# Patient Record
Sex: FEMALE | Race: BLACK OR AFRICAN AMERICAN | NOT HISPANIC OR LATINO | Employment: FULL TIME | URBAN - METROPOLITAN AREA
[De-identification: names, ages, dates, MRNs, and addresses within clinical notes are randomized per-mention and may not be internally consistent; named-entity substitution may affect disease eponyms.]

---

## 2017-02-23 ENCOUNTER — TELEPHONE (OUTPATIENT)
Dept: NURSING | Facility: CLINIC | Age: 22
End: 2017-02-23

## 2017-02-24 NOTE — TELEPHONE ENCOUNTER
"Call Type: Triage Call    Presenting Problem: \"There is a lump under my armpit that has deep  hole draining musuc, pus-like drainage.\"  Triage Note:  Guideline Title: Wound Infection Suspected (Pediatric) ; Boil (Skin  Abscess) (Pediatric)  Recommended Disposition: See ED Immediately  Original Inclination:  Override Disposition:  Intended Action:  Physician Contacted: No  Black (necrotic) tissue or blisters develop in wound ?  YES  [1] Wound infection AND [2] taking an antibiotic ? NO  Severe pain in the wound ? NO  [1] Widespread rash AND [2] bright red, sunburn-like ? NO  Sounds like a life-threatening emergency to the triager ? NO  [1] Widespread bright red rash AND [2] fainted or too weak to stand ? NO  Stitches and not infected ? NO  [1] MRSA questions or exposure AND [2] no symptoms ? NO  Major surgical wound ? NO  [1] Animal or human bite that is infected AND [2] taking an antibiotic ? NO  [1] Boil (skin abscess) AND [2] taking an antibiotic and/or incised and drained ?  NO  [1] Cellulitis diagnosed AND [2] taking an antibiotic ? NO  Boil suspected (red lump in skin) ? NO  Sounds like a life-threatening emergency to the triager ? NO  [1] Widespread red rash AND [2] fever AND [3] fainted or too weak to stand ? NO  [1] Boil (skin abscess) AND [2] taking an antibiotic and/or incised and drained ?  NO  Physician Instructions:  Care Advice: GO TO ED NOW: * Your child needs to be seen within the next  hour. Go to the ER/UCC at _____________ Hospital. Leave as soon as you can.  PAIN MEDICINE: * For pain relief, give acetaminophen every 4 hours OR  ibuprofen every 6 hours as needed. (See Dosage table.)  "

## 2017-07-19 ENCOUNTER — COMMUNICATION - HEALTHEAST (OUTPATIENT)
Dept: FAMILY MEDICINE | Facility: CLINIC | Age: 22
End: 2017-07-19

## 2017-07-19 ENCOUNTER — OFFICE VISIT - HEALTHEAST (OUTPATIENT)
Dept: FAMILY MEDICINE | Facility: CLINIC | Age: 22
End: 2017-07-19

## 2017-07-19 DIAGNOSIS — A64 STD (FEMALE): ICD-10-CM

## 2017-07-19 DIAGNOSIS — N76.0 BACTERIAL VAGINOSIS: ICD-10-CM

## 2017-07-19 DIAGNOSIS — N89.8 VAGINAL IRRITATION: ICD-10-CM

## 2017-07-19 DIAGNOSIS — Z00.00 ANNUAL PHYSICAL EXAM: ICD-10-CM

## 2017-07-19 DIAGNOSIS — B96.89 BACTERIAL VAGINOSIS: ICD-10-CM

## 2017-07-19 ASSESSMENT — MIFFLIN-ST. JEOR: SCORE: 1445.55

## 2017-07-20 ENCOUNTER — COMMUNICATION - HEALTHEAST (OUTPATIENT)
Dept: FAMILY MEDICINE | Facility: CLINIC | Age: 22
End: 2017-07-20

## 2017-07-27 LAB
BKR LAB AP ABNORMAL BLEEDING: NO
BKR LAB AP BIRTH CONTROL/HORMONES: NORMAL
BKR LAB AP CERVICAL APPEARANCE: NORMAL
BKR LAB AP GYN ADEQUACY: NORMAL
BKR LAB AP GYN INTERPRETATION: NORMAL
BKR LAB AP GYN OTHER FINDINGS: NORMAL
BKR LAB AP HPV REFLEX: NORMAL
BKR LAB AP LMP: NORMAL
BKR LAB AP PATIENT STATUS: NORMAL
BKR LAB AP PREVIOUS ABNORMAL: NORMAL
BKR LAB AP PREVIOUS NORMAL: NORMAL
HIGH RISK?: NO
PATH REPORT.COMMENTS IMP SPEC: NORMAL
RESULT FLAG (HE HISTORICAL CONVERSION): NORMAL

## 2017-11-06 ENCOUNTER — COMMUNICATION - HEALTHEAST (OUTPATIENT)
Dept: FAMILY MEDICINE | Facility: CLINIC | Age: 22
End: 2017-11-06

## 2018-01-15 ENCOUNTER — COMMUNICATION - HEALTHEAST (OUTPATIENT)
Dept: FAMILY MEDICINE | Facility: CLINIC | Age: 23
End: 2018-01-15

## 2018-06-28 ENCOUNTER — COMMUNICATION - HEALTHEAST (OUTPATIENT)
Dept: FAMILY MEDICINE | Facility: CLINIC | Age: 23
End: 2018-06-28

## 2018-07-18 ENCOUNTER — OFFICE VISIT - HEALTHEAST (OUTPATIENT)
Dept: FAMILY MEDICINE | Facility: CLINIC | Age: 23
End: 2018-07-18

## 2018-07-18 ENCOUNTER — COMMUNICATION - HEALTHEAST (OUTPATIENT)
Dept: FAMILY MEDICINE | Facility: CLINIC | Age: 23
End: 2018-07-18

## 2018-07-18 ENCOUNTER — OFFICE VISIT - HEALTHEAST (OUTPATIENT)
Dept: MIDWIFE SERVICES | Facility: CLINIC | Age: 23
End: 2018-07-18

## 2018-07-18 DIAGNOSIS — B37.31 YEAST INFECTION OF THE VAGINA: ICD-10-CM

## 2018-07-18 DIAGNOSIS — N89.8 VAGINAL DISCHARGE: ICD-10-CM

## 2018-07-18 DIAGNOSIS — Z78.9 DATE OF LAST MENSTRUAL PERIOD (LMP) UNKNOWN: ICD-10-CM

## 2018-07-18 DIAGNOSIS — Z30.431 IUD CHECK UP: ICD-10-CM

## 2018-07-18 DIAGNOSIS — T83.32XA MALPOSITIONED INTRAUTERINE DEVICE (IUD), INITIAL ENCOUNTER: ICD-10-CM

## 2018-07-18 LAB
CLUE CELLS: ABNORMAL
HCG UR QL: NEGATIVE
SP GR UR STRIP: 1.01 (ref 1–1.03)
TRICHOMONAS, WET PREP: ABNORMAL
YEAST, WET PREP: ABNORMAL

## 2018-07-18 ASSESSMENT — MIFFLIN-ST. JEOR: SCORE: 1486.37

## 2018-07-19 LAB
C TRACH DNA SPEC QL PROBE+SIG AMP: NEGATIVE
N GONORRHOEA DNA SPEC QL NAA+PROBE: NEGATIVE

## 2018-07-20 ENCOUNTER — HOSPITAL ENCOUNTER (OUTPATIENT)
Dept: ULTRASOUND IMAGING | Facility: CLINIC | Age: 23
Discharge: HOME | End: 2018-07-20
Attending: ADVANCED PRACTICE MIDWIFE

## 2018-07-20 ENCOUNTER — COMMUNICATION - HEALTHEAST (OUTPATIENT)
Dept: FAMILY MEDICINE | Facility: CLINIC | Age: 23
End: 2018-07-20

## 2018-07-20 DIAGNOSIS — T83.32XA MALPOSITIONED INTRAUTERINE DEVICE (IUD), INITIAL ENCOUNTER: ICD-10-CM

## 2018-07-25 ENCOUNTER — COMMUNICATION - HEALTHEAST (OUTPATIENT)
Dept: FAMILY MEDICINE | Facility: CLINIC | Age: 23
End: 2018-07-25

## 2018-12-19 ENCOUNTER — COMMUNICATION - HEALTHEAST (OUTPATIENT)
Dept: MIDWIFE SERVICES | Facility: CLINIC | Age: 23
End: 2018-12-19

## 2018-12-24 ENCOUNTER — OFFICE VISIT - HEALTHEAST (OUTPATIENT)
Dept: MIDWIFE SERVICES | Facility: CLINIC | Age: 23
End: 2018-12-24

## 2018-12-24 DIAGNOSIS — Z30.430 ENCOUNTER FOR IUD INSERTION: ICD-10-CM

## 2018-12-24 DIAGNOSIS — Z01.812 PRE-PROCEDURE LAB EXAM: ICD-10-CM

## 2018-12-24 LAB
HCG UR QL: NEGATIVE
SP GR UR STRIP: 1.03 (ref 1–1.03)

## 2018-12-24 ASSESSMENT — MIFFLIN-ST. JEOR: SCORE: 1465.96

## 2018-12-26 LAB
C TRACH DNA SPEC QL PROBE+SIG AMP: NEGATIVE
N GONORRHOEA DNA SPEC QL NAA+PROBE: NEGATIVE

## 2019-02-01 ENCOUNTER — OFFICE VISIT - HEALTHEAST (OUTPATIENT)
Dept: FAMILY MEDICINE | Facility: CLINIC | Age: 24
End: 2019-02-01

## 2019-02-01 DIAGNOSIS — A08.4 VIRAL GASTROENTERITIS: ICD-10-CM

## 2019-02-01 DIAGNOSIS — R11.2 NAUSEA AND VOMITING, INTRACTABILITY OF VOMITING NOT SPECIFIED, UNSPECIFIED VOMITING TYPE: ICD-10-CM

## 2019-02-01 LAB
ALBUMIN SERPL-MCNC: 4 G/DL (ref 3.5–5)
ALP SERPL-CCNC: 56 U/L (ref 45–120)
ALT SERPL W P-5'-P-CCNC: 14 U/L (ref 0–45)
ANION GAP SERPL CALCULATED.3IONS-SCNC: 11 MMOL/L (ref 5–18)
AST SERPL W P-5'-P-CCNC: 16 U/L (ref 0–40)
BASOPHILS # BLD AUTO: 0 THOU/UL (ref 0–0.2)
BASOPHILS NFR BLD AUTO: 0 % (ref 0–2)
BILIRUB SERPL-MCNC: 0.3 MG/DL (ref 0–1)
BUN SERPL-MCNC: 5 MG/DL (ref 8–22)
CALCIUM SERPL-MCNC: 9.8 MG/DL (ref 8.5–10.5)
CHLORIDE BLD-SCNC: 107 MMOL/L (ref 98–107)
CO2 SERPL-SCNC: 25 MMOL/L (ref 22–31)
CREAT SERPL-MCNC: 0.87 MG/DL (ref 0.6–1.1)
DEPRECATED S PYO AG THROAT QL EIA: NORMAL
EOSINOPHIL # BLD AUTO: 0.1 THOU/UL (ref 0–0.4)
EOSINOPHIL NFR BLD AUTO: 2 % (ref 0–6)
ERYTHROCYTE [DISTWIDTH] IN BLOOD BY AUTOMATED COUNT: 13.3 % (ref 11–14.5)
GFR SERPL CREATININE-BSD FRML MDRD: >60 ML/MIN/1.73M2
GLUCOSE BLD-MCNC: 91 MG/DL (ref 70–125)
HCT VFR BLD AUTO: 40.6 % (ref 35–47)
HGB BLD-MCNC: 13.3 G/DL (ref 12–16)
LYMPHOCYTES # BLD AUTO: 2 THOU/UL (ref 0.8–4.4)
LYMPHOCYTES NFR BLD AUTO: 37 % (ref 20–40)
MCH RBC QN AUTO: 28.7 PG (ref 27–34)
MCHC RBC AUTO-ENTMCNC: 32.7 G/DL (ref 32–36)
MCV RBC AUTO: 88 FL (ref 80–100)
MONOCYTES # BLD AUTO: 0.4 THOU/UL (ref 0–0.9)
MONOCYTES NFR BLD AUTO: 7 % (ref 2–10)
NEUTROPHILS # BLD AUTO: 3 THOU/UL (ref 2–7.7)
NEUTROPHILS NFR BLD AUTO: 54 % (ref 50–70)
PLATELET # BLD AUTO: 280 THOU/UL (ref 140–440)
PMV BLD AUTO: 8.3 FL (ref 7–10)
POTASSIUM BLD-SCNC: 4.4 MMOL/L (ref 3.5–5)
PROT SERPL-MCNC: 7.6 G/DL (ref 6–8)
RBC # BLD AUTO: 4.62 MILL/UL (ref 3.8–5.4)
SODIUM SERPL-SCNC: 143 MMOL/L (ref 136–145)
WBC: 5.5 THOU/UL (ref 4–11)

## 2019-02-02 LAB — GROUP A STREP BY PCR: NORMAL

## 2019-02-21 ENCOUNTER — COMMUNICATION - HEALTHEAST (OUTPATIENT)
Dept: FAMILY MEDICINE | Facility: CLINIC | Age: 24
End: 2019-02-21

## 2019-02-28 ENCOUNTER — OFFICE VISIT - HEALTHEAST (OUTPATIENT)
Dept: FAMILY MEDICINE | Facility: CLINIC | Age: 24
End: 2019-02-28

## 2019-02-28 DIAGNOSIS — F43.23 ADJUSTMENT DISORDER WITH MIXED ANXIETY AND DEPRESSED MOOD: ICD-10-CM

## 2019-03-06 ENCOUNTER — COMMUNICATION - HEALTHEAST (OUTPATIENT)
Dept: FAMILY MEDICINE | Facility: CLINIC | Age: 24
End: 2019-03-06

## 2019-03-27 ENCOUNTER — COMMUNICATION - HEALTHEAST (OUTPATIENT)
Dept: FAMILY MEDICINE | Facility: CLINIC | Age: 24
End: 2019-03-27

## 2019-04-04 ENCOUNTER — OFFICE VISIT - HEALTHEAST (OUTPATIENT)
Dept: FAMILY MEDICINE | Facility: CLINIC | Age: 24
End: 2019-04-04

## 2019-04-04 DIAGNOSIS — F43.23 ADJUSTMENT DISORDER WITH MIXED ANXIETY AND DEPRESSED MOOD: ICD-10-CM

## 2019-05-09 ENCOUNTER — OFFICE VISIT - HEALTHEAST (OUTPATIENT)
Dept: MIDWIFE SERVICES | Facility: CLINIC | Age: 24
End: 2019-05-09

## 2019-05-09 ENCOUNTER — AMBULATORY - HEALTHEAST (OUTPATIENT)
Dept: MIDWIFE SERVICES | Facility: CLINIC | Age: 24
End: 2019-05-09

## 2019-05-09 DIAGNOSIS — Z30.431 IUD CHECK UP: ICD-10-CM

## 2019-05-09 DIAGNOSIS — B37.31 YEAST VAGINITIS: ICD-10-CM

## 2019-05-09 DIAGNOSIS — Z11.3 SCREEN FOR STD (SEXUALLY TRANSMITTED DISEASE): ICD-10-CM

## 2019-05-09 LAB
CLUE CELLS: ABNORMAL
HIV 1+2 AB+HIV1 P24 AG SERPL QL IA: NEGATIVE
TRICHOMONAS, WET PREP: ABNORMAL
YEAST, WET PREP: ABNORMAL

## 2019-05-09 ASSESSMENT — MIFFLIN-ST. JEOR: SCORE: 1406.99

## 2019-05-10 LAB
C TRACH DNA SPEC QL PROBE+SIG AMP: NEGATIVE
N GONORRHOEA DNA SPEC QL NAA+PROBE: NEGATIVE
T PALLIDUM AB SER QL: NEGATIVE

## 2019-07-29 ENCOUNTER — COMMUNICATION - HEALTHEAST (OUTPATIENT)
Dept: FAMILY MEDICINE | Facility: CLINIC | Age: 24
End: 2019-07-29

## 2019-07-29 DIAGNOSIS — F43.23 ADJUSTMENT DISORDER WITH MIXED ANXIETY AND DEPRESSED MOOD: ICD-10-CM

## 2019-08-07 ENCOUNTER — OFFICE VISIT - HEALTHEAST (OUTPATIENT)
Dept: FAMILY MEDICINE | Facility: CLINIC | Age: 24
End: 2019-08-07

## 2019-08-07 DIAGNOSIS — F43.23 ADJUSTMENT DISORDER WITH MIXED ANXIETY AND DEPRESSED MOOD: ICD-10-CM

## 2019-08-14 ENCOUNTER — COMMUNICATION - HEALTHEAST (OUTPATIENT)
Dept: FAMILY MEDICINE | Facility: CLINIC | Age: 24
End: 2019-08-14

## 2019-08-14 DIAGNOSIS — F43.23 ADJUSTMENT DISORDER WITH MIXED ANXIETY AND DEPRESSED MOOD: ICD-10-CM

## 2019-08-20 ENCOUNTER — COMMUNICATION - HEALTHEAST (OUTPATIENT)
Dept: FAMILY MEDICINE | Facility: CLINIC | Age: 24
End: 2019-08-20

## 2019-10-05 ENCOUNTER — COMMUNICATION - HEALTHEAST (OUTPATIENT)
Dept: FAMILY MEDICINE | Facility: CLINIC | Age: 24
End: 2019-10-05

## 2019-10-05 DIAGNOSIS — F43.23 ADJUSTMENT DISORDER WITH MIXED ANXIETY AND DEPRESSED MOOD: ICD-10-CM

## 2019-10-08 ENCOUNTER — COMMUNICATION - HEALTHEAST (OUTPATIENT)
Dept: SCHEDULING | Facility: CLINIC | Age: 24
End: 2019-10-08

## 2019-10-22 ENCOUNTER — COMMUNICATION - HEALTHEAST (OUTPATIENT)
Dept: FAMILY MEDICINE | Facility: CLINIC | Age: 24
End: 2019-10-22

## 2019-11-26 ENCOUNTER — OFFICE VISIT - HEALTHEAST (OUTPATIENT)
Dept: FAMILY MEDICINE | Facility: CLINIC | Age: 24
End: 2019-11-26

## 2019-11-26 DIAGNOSIS — F41.1 GAD (GENERALIZED ANXIETY DISORDER): ICD-10-CM

## 2019-11-26 DIAGNOSIS — F43.23 ADJUSTMENT DISORDER WITH MIXED ANXIETY AND DEPRESSED MOOD: ICD-10-CM

## 2019-11-26 ASSESSMENT — ANXIETY QUESTIONNAIRES
5. BEING SO RESTLESS THAT IT IS HARD TO SIT STILL: NEARLY EVERY DAY
4. TROUBLE RELAXING: MORE THAN HALF THE DAYS
3. WORRYING TOO MUCH ABOUT DIFFERENT THINGS: MORE THAN HALF THE DAYS
IF YOU CHECKED OFF ANY PROBLEMS ON THIS QUESTIONNAIRE, HOW DIFFICULT HAVE THESE PROBLEMS MADE IT FOR YOU TO DO YOUR WORK, TAKE CARE OF THINGS AT HOME, OR GET ALONG WITH OTHER PEOPLE: VERY DIFFICULT
2. NOT BEING ABLE TO STOP OR CONTROL WORRYING: MORE THAN HALF THE DAYS
1. FEELING NERVOUS, ANXIOUS, OR ON EDGE: MORE THAN HALF THE DAYS
6. BECOMING EASILY ANNOYED OR IRRITABLE: SEVERAL DAYS
GAD7 TOTAL SCORE: 13
7. FEELING AFRAID AS IF SOMETHING AWFUL MIGHT HAPPEN: SEVERAL DAYS

## 2019-11-26 ASSESSMENT — PATIENT HEALTH QUESTIONNAIRE - PHQ9: SUM OF ALL RESPONSES TO PHQ QUESTIONS 1-9: 18

## 2019-12-11 ENCOUNTER — COMMUNICATION - HEALTHEAST (OUTPATIENT)
Dept: FAMILY MEDICINE | Facility: CLINIC | Age: 24
End: 2019-12-11

## 2019-12-19 ENCOUNTER — OFFICE VISIT - HEALTHEAST (OUTPATIENT)
Dept: FAMILY MEDICINE | Facility: CLINIC | Age: 24
End: 2019-12-19

## 2019-12-19 DIAGNOSIS — F41.1 GAD (GENERALIZED ANXIETY DISORDER): ICD-10-CM

## 2019-12-19 DIAGNOSIS — F43.23 ADJUSTMENT DISORDER WITH MIXED ANXIETY AND DEPRESSED MOOD: ICD-10-CM

## 2019-12-19 ASSESSMENT — ANXIETY QUESTIONNAIRES
2. NOT BEING ABLE TO STOP OR CONTROL WORRYING: MORE THAN HALF THE DAYS
3. WORRYING TOO MUCH ABOUT DIFFERENT THINGS: MORE THAN HALF THE DAYS
7. FEELING AFRAID AS IF SOMETHING AWFUL MIGHT HAPPEN: SEVERAL DAYS
4. TROUBLE RELAXING: NEARLY EVERY DAY
GAD7 TOTAL SCORE: 14
IF YOU CHECKED OFF ANY PROBLEMS ON THIS QUESTIONNAIRE, HOW DIFFICULT HAVE THESE PROBLEMS MADE IT FOR YOU TO DO YOUR WORK, TAKE CARE OF THINGS AT HOME, OR GET ALONG WITH OTHER PEOPLE: VERY DIFFICULT
5. BEING SO RESTLESS THAT IT IS HARD TO SIT STILL: NEARLY EVERY DAY
6. BECOMING EASILY ANNOYED OR IRRITABLE: SEVERAL DAYS
1. FEELING NERVOUS, ANXIOUS, OR ON EDGE: MORE THAN HALF THE DAYS

## 2019-12-19 ASSESSMENT — PATIENT HEALTH QUESTIONNAIRE - PHQ9: SUM OF ALL RESPONSES TO PHQ QUESTIONS 1-9: 11

## 2019-12-27 ENCOUNTER — COMMUNICATION - HEALTHEAST (OUTPATIENT)
Dept: FAMILY MEDICINE | Facility: CLINIC | Age: 24
End: 2019-12-27

## 2020-05-07 ENCOUNTER — COMMUNICATION - HEALTHEAST (OUTPATIENT)
Dept: FAMILY MEDICINE | Facility: CLINIC | Age: 25
End: 2020-05-07

## 2020-05-07 DIAGNOSIS — F43.23 ADJUSTMENT DISORDER WITH MIXED ANXIETY AND DEPRESSED MOOD: ICD-10-CM

## 2020-05-07 DIAGNOSIS — F41.1 GAD (GENERALIZED ANXIETY DISORDER): ICD-10-CM

## 2020-05-20 ENCOUNTER — COMMUNICATION - HEALTHEAST (OUTPATIENT)
Dept: FAMILY MEDICINE | Facility: CLINIC | Age: 25
End: 2020-05-20

## 2020-05-20 DIAGNOSIS — F43.23 ADJUSTMENT DISORDER WITH MIXED ANXIETY AND DEPRESSED MOOD: ICD-10-CM

## 2020-07-13 ENCOUNTER — VIRTUAL VISIT (OUTPATIENT)
Dept: FAMILY MEDICINE | Facility: OTHER | Age: 25
End: 2020-07-13

## 2020-07-13 NOTE — PROGRESS NOTES
"Date: 2020 11:10:15  Clinician: Artur Melchor  Clinician NPI: [id number]  Patient: Timoteo Alamo  Patient : 1995  [place] [address] [place] [id number]  Patient Phone: [phone]  Visit Protocol: URI  Patient Summary:  Timoteo is a 25 year old ( : 1995 ) female who initiated a Visit for COVID-19 (Coronavirus) evaluation and screening. When asked the question \"Please sign me up to receive news, health information and promotions from [employer].\", Timoteo responded \"No\".    Timoteo states her symptoms started suddenly 3-4 days ago.   Her symptoms consist of ageusia, rhinitis, malaise, a headache, chills, myalgia, anosmia, facial pain or pressure, a cough, and nasal congestion. She is experiencing difficulty breathing due to nasal congestion but she is not short of breath.   Symptom details     Nasal secretions: The color of her mucus is white and clear.    Cough: Timoteo coughs a few times an hour and her cough is not more bothersome at night. Phlegm comes into her throat when she coughs. She believes her cough is caused by post-nasal drip. The color of the phlegm is yellow.     Facial pain or pressure: The facial pain or pressure feels worse when bending over or leaning forward.     Headache: She states the headache is moderate (4-6 on a 10 point pain scale).      Timoteo denies having wheezing, nausea, teeth pain, diarrhea, vomiting, ear pain, sore throat, and fever. She also denies having recent facial or sinus surgery in the past 60 days, taking antibiotic medication in the past month, and double sickening (worsening symptoms after initial improvement).   Precipitating events  She has not recently been exposed to someone with influenza. Timoteo has not been in close contact with any high risk individuals.   Pertinent COVID-19 (Coronavirus) information  In the past 14 days, Timoteo has not worked in a congregate living setting.   She does not work or volunteer as healthcare " worker or a  and does not work or volunteer in a healthcare facility.   Timoteo also has not lived in a congregate living setting in the past 14 days. She does not live with a healthcare worker.   Timoteo has not had a close contact with a laboratory-confirmed COVID-19 patient within 14 days of symptom onset.   Pertinent medical history  Timoteo typically gets a yeast infection when she takes antibiotics. She is not sure if she has used fluconazole (Diflucan) to treat previous yeast infections.   Timoteo needs a return to work/school note.   Weight: 148 lbs   Timoteo does not smoke or use smokeless tobacco.   She denies pregnancy and denies breastfeeding. She has menstruated in the past month.   Weight: 148 lbs    MEDICATIONS: buspirone oral, duloxetine oral, ALLERGIES: NKDA  Clinician Response:  Dear Timoteo,   Your symptoms show that you may have coronavirus (COVID-19). This illness can cause fever, cough and trouble breathing. Many people get a mild case and get better on their own. Some people can get very sick.  What should I do?  We would like to test you for this virus.   1. Please call [phone] to schedule your visit. Explain that you were referred by [facility] to have a COVID-19 test. Be ready to share your [facility] visit ID number.  The following will serve as your written order for this COVID Test, ordered by me, for the indication of suspected COVID [Z20.828]: The test will be ordered in [employer], our electronic health record, after you are scheduled. It will show as ordered and authorized by Shiv Mckeon MD.  Order: COVID-19 (Coronavirus) PCR for SYMPTOMATIC testing from [facility].      2. When it's time for your COVID test:  Stay at least 6 feet away from others. (If someone will drive you to your test, stay in the backseat, as far away from the  as you can.)   Cover your mouth and nose with a mask, tissue or washcloth.  Go straight to the testing site. Don't make any stops on the way there  "or back.      3.Starting now: Stay home and away from others (self-isolate) until:   You've had no fever---and no medicine that reduces fever---for 3 full days (72 hours). And...   Your other symptoms have gotten better. For example, your cough or breathing has improved. And...   At least 10 days have passed since your symptoms started.       During this time, don't leave the house except for testing or medical care.   Stay in your own room, even for meals. Use your own bathroom if you can.   Stay away from others in your home. No hugging, kissing or shaking hands. No visitors.  Don't go to work, school or anywhere else.    Clean \"high touch\" surfaces often (doorknobs, counters, handles, etc.). Use a household cleaning spray or wipes. You'll find a full list of  on the EPA website: www.epa.gov/pesticide-registration/list-n-disinfectants-use-against-sars-cov-2.   Cover your mouth and nose with a mask, tissue or washcloth to avoid spreading germs.  Wash your hands and face often. Use soap and water.  Caregivers in these groups are at risk for severe illness due to COVID-19:  o People 65 years and older  o People who live in a nursing home or long-term care facility  o People with chronic disease (lung, heart, cancer, diabetes, kidney, liver, immunologic)  o People who have a weakened immune system, including those who:   Are in cancer treatment  Take medicine that weakens the immune system, such as corticosteroids  Had a bone marrow or organ transplant  Have an immune deficiency  Have poorly controlled HIV or AIDS  Are obese (body mass index of 40 or higher)  Smoke regularly   o Caregivers should wear gloves while washing dishes, handling laundry and cleaning bedrooms and bathrooms.  o Use caution when washing and drying laundry: Don't shake dirty laundry, and use the warmest water setting that you can.  o For more tips, go to www.cdc.gov/coronavirus/2019-ncov/downloads/10Things.pdf.    4.Sign up for GetWell " [employer]. We know it's scary to hear that you might have COVID-19. We want to track your symptoms to make sure you're okay over the next 2 weeks. Please look for an email from [employer]---this is a free, online program that we'll use to keep in touch. To sign up, follow the link in the email. Learn more at [email or web address]  How can I take care of myself?   Get lots of rest. Drink extra fluids (unless a doctor has told you not to).   Take Tylenol (acetaminophen) for fever or pain. If you have liver or kidney problems, ask your family doctor if it's okay to take Tylenol.   Adults can take either:    650 mg (two 325 mg pills) every 4 to 6 hours, or...   1,000 mg (two 500 mg pills) every 8 hours as needed.    Note: Don't take more than 3,000 mg in one day. Acetaminophen is found in many medicines (both prescribed and over-the-counter medicines). Read all labels to be sure you don't take too much.   For children, check the Tylenol bottle for the right dose. The dose is based on the child's age or weight.    If you have other health problems (like cancer, heart failure, an organ transplant or severe kidney disease): Call your specialty clinic if you don't feel better in the next 2 days.       Know when to call 911. Emergency warning signs include:    Trouble breathing or shortness of breath Pain or pressure in the chest that doesn't go away Feeling confused like you haven't felt before, or not being able to wake up Bluish-colored lips or face.  Where can I get more information?    [facility] -- About COVID-19: [email or web address]   CDC -- What to Do If You're Sick: www.cdc.gov/coronavirus/2019-ncov/about/steps-when-sick.html   CDC -- Ending Home Isolation: www.cdc.gov/coronavirus/2019-ncov/hcp/disposition-in-home-patients.html   CDC -- Caring for Someone: www.cdc.gov/coronavirus/2019-ncov/if-you-are-sick/care-for-someone.html   [facility] -- Interim Guidance for Hospital Discharge to Home:  www.health.[facility].[place]/diseases/coronavirus/hcp/hospdischarge.pdf   [facility] clinical trials (COVID-19 research studies): clinicalaffairs.[facility]/umn-clinical-trials    Below are the COVID-19 hotlines at the [facility] ([facility]). Interpreters are available.    For health questions: Call [phone] or [phone] (7 a.m. to 7 p.m.) For questions about schools and childcare: Call [phone] or [phone] (7 a.m. to 7 p.m.)    Diagnosis: Nasal congestion  Diagnosis ICD: R09.81

## 2020-07-16 DIAGNOSIS — Z20.822 COVID-19 RULED OUT: Primary | ICD-10-CM

## 2020-07-16 PROCEDURE — 99207 ZZC NO BILLABLE SERVICE THIS VISIT: CPT

## 2020-07-16 PROCEDURE — U0003 INFECTIOUS AGENT DETECTION BY NUCLEIC ACID (DNA OR RNA); SEVERE ACUTE RESPIRATORY SYNDROME CORONAVIRUS 2 (SARS-COV-2) (CORONAVIRUS DISEASE [COVID-19]), AMPLIFIED PROBE TECHNIQUE, MAKING USE OF HIGH THROUGHPUT TECHNOLOGIES AS DESCRIBED BY CMS-2020-01-R: HCPCS | Performed by: FAMILY MEDICINE

## 2020-07-16 NOTE — LETTER
July 17, 2020        Timoteo Alamo  [address]   [place] [address]    This letter provides a written record that you were tested for COVID-19 on 7/16/20.     Your result was positive. This means you have COVID-19 (coronavirus).    This means that we found the virus that causes COVID-19 in your sample.    How can I protect others?If you have symptoms, stay home and away from others (self-isolate) until:You ve had no fever--and no medicine that reduces fever--for 3 full days (72 hours). And      Your other symptoms have gotten better. For example, your cough or breathing has improved. And     At least 10 days have passed since your symptoms started.    If you don t have symptoms: Stay home and away from others (self-isolate) until at least 10 days have passed since your first positive COVID-19 test.    During this time:    Stay in your own room, including for meals. Use your own bathroom if you can.    Stay away from others in your home. No hugging, kissing or shaking hands. No visitors.     Don t go to work, school or anywhere else.     Clean  high touch  surfaces often (doorknobs, counters, handles, etc.). Use a household cleaning spray or wipes. You ll find a full list on the EPA website at www.epa.gov/pesticide-registration/list-n-disinfectants-use-against-sars-cov-2.     Cover your mouth and nose with a mask, tissue or washcloth to avoid spreading germs.    Wash your hands and face often with soap and water.    Caregivers in these groups are at risk for severe illness due to COVID-19:  o People 65 years and older  o People who live in a nursing home or long-term care facility  o People with chronic disease (lung, heart, cancer, diabetes, kidney, liver, immunologic)  o People who have a weakened immune system, including those who:  - Are in cancer treatment  - Take medicine that weakens the immune system, such as corticosteroids  - Had a bone marrow or organ transplant  - Have an immune  deficiency  - Have poorly controlled HIV or AIDS  - Are obese (body mass index of 40 or higher)  - Smoke regularly    Caregivers should wear gloves while washing dishes, handling laundry and cleaning bedrooms and bathrooms.    Wash and dry laundry with special caution. Don t shake dirty laundry, and use the warmest water setting you can.    If you have a weakened immune system, ask your doctor about other actions you should take.    For more tips, go to www.cdc.gov/coronavirus/2019-ncov/downloads/10Things.pdf.    You should not go back to work until you meet the guidelines above for ending your home isolation. You should meet these along with any other guidelines that your employer has.    Employers: This document serves as formal notice of your employee s medical guidelines for going back to work. They must meet the above guidelines before going back to work in person.    How can I take care of myself?    1. Get lots of rest. Drink extra fluids (unless a doctor has told you not to).    2. Take Tylenol (acetaminophen) for fever or pain. If you have liver or kidney problems, ask your family doctor if it s okay to take Tylenol.     Take either:     650 mg (two 325 mg pills) every 4 to 6 hours, or     1,000 mg (two 500 mg pills) every 8 hours as needed.     Note: Don t take more than 3,000 mg in one day. Acetaminophen is found in many medicines (both prescribed and over-the-counter medicines). Read all labels to be sure you don t take too much.    For children, check the Tylenol bottle for the right dose (based on their age or weight).    3. If you have other health problems (like cancer, heart failure, an organ transplant or severe kidney disease): Call your specialty clinic if you don t feel better in the next 2 days.    4. Know when to call 911: Emergency warning signs include:    Trouble breathing or shortness of breath    Pain or pressure in the chest that doesn t go away    Feeling confused like you haven t felt  before, or not being able to wake up    Bluish-colored lips or face    5. Sign up for [facility]. We know it s scary to hear that you have COVID-19. We want to track your symptoms to make sure you re okay over the next 2 weeks. Please look for an email from [facility]--this is a free, online program that we ll use to keep in touch. To sign up, follow the link in the email. Learn more at [email or web address].      Where can I get more information?    [facility] Check: [email or web address]    Coronavirus Basics: www.health.[facility].[place]/diseases/coronavirus/basics.html    What to Do If You re Sick: www.cdc.gov/coronavirus/2019-ncov/about/steps-when-sick.html    Ending Home Isolation: www.cdc.gov/coronavirus/2019-ncov/hcp/disposition-in-home-patients.html     Caring for Someone with COVID-19: www.cdc.gov/coronavirus/2019-ncov/if-you-are-sick/care-for-someone.html     [facility] clinical trials (COVID-19 research studies): clinicalaffairs.[facility]-clinical-trials

## 2020-07-17 ENCOUNTER — TELEPHONE (OUTPATIENT)
Dept: URGENT CARE | Facility: URGENT CARE | Age: 25
End: 2020-07-17

## 2020-07-17 LAB
SARS-COV-2 RNA SPEC QL NAA+PROBE: ABNORMAL
SPECIMEN SOURCE: ABNORMAL

## 2020-07-17 NOTE — TELEPHONE ENCOUNTER
"Coronavirus (COVID-19) Notification    Caller Name (Patient, parent, daughter/son, grandparent, etc)  Frederick    Reason for call  Notify of Positive Coronavirus (COVID-19) lab results, assess symptoms,  review [facility] recommendations    Lab Result    Lab test:  2019-nCoV rRt-PCR or SARS-CoV-2 PCR    Oropharyngeal AND/OR nasopharyngeal swabs is POSITIVE for 2019-nCoV RNA/SARS-COV-2 PCR (COVID-19 virus)    RN Recommendations/Instructions per [facility] Coronavirus COVID-19 recommendations    Brief introduction script  Introduce self then review script:  \"I am calling on behalf of [employer].  We were notified that your Coronavirus test (COVID-19) for was POSITIVE for the virus.  I have some information to relay to you but first I wanted to mention that the [place] Dept of Health will be contacting you shortly [it's possible MD already called Patient] to talk to you more about how you are feeling and other people you have had contact with who might now also have the virus.  Also, [facility] is Partnering with the [facility] for Covid-19 research, you may be contacted directly by research staff.\"    Assessment (Inquire about Patient's current symptoms)   Assessment   Current Symptoms at time of phone call: (if no symptoms, document No symptoms] Sinus congestion, no smell, mild coughing.    Symptoms onset (if applicable) 7/10/20      If at time of call, Patients symptoms hare worsened, the Patient should contact 911 or have someone drive them to Emergency Dept promptly:      If Patient calling 911, inform 911 personal that you have tested positive for the Coronavirus (COVID-19).  Place mask on and await 911 to arrive.    If Emergency Dept, If possible, please have another adult drive you to the Emergency Dept but you need to wear mask when in contact with other people.      Review information with Patient    Your result was positive. This means you have COVID-19 (coronavirus).  We have sent " you a letter that reviews the information that I'll be reviewing with you now.    How can I protect others?    If you have symptoms: stay home and away from others (self-isolate) until:    You've had no fever--and no medicine that reduces fever--for 3 full days (72 hours). And      Your other symptoms have gotten better. For example, your cough or breathing has improved. And     At least 10 days have passed since your symptoms started.    If you don't have symptoms: Stay home and away from others (self-isolate) until at least 10 days have passed since your first positive COVID-19 test. (Date test collected)    During this time:    Stay in your own room, including for meals. Use your own bathroom if you can.    Stay away from others in your home. No hugging, kissing or shaking hands. No visitors.     Don't go to work, school or anywhere else.     Clean  high touch  surfaces often (doorknobs, counters, handles, etc.). Use a household cleaning spray or wipes. You'll find a full list on the EPA website at www.epa.gov/pesticide-registration/list-n-disinfectants-use-against-sars-cov-2.     Cover your mouth and nose with a mask, tissue or washcloth to avoid spreading germs.    Wash your hands and face often with soap and water.    Caregivers in these groups are at risk for severe illness due to COVID-19:  o People 65 years and older  o People who live in a nursing home or long-term care facility  o People with chronic disease (lung, heart, cancer, diabetes, kidney, liver, immunologic)  o People who have a weakened immune system, including those who:  - Are in cancer treatment  - Take medicine that weakens the immune system, such as corticosteroids  - Had a bone marrow or organ transplant  - Have an immune deficiency  - Have poorly controlled HIV or AIDS  - Are obese (body mass index of 40 or higher)  - Smoke regularly    Caregivers should wear gloves while washing dishes, handling laundry and cleaning bedrooms and  bathrooms.    Wash and dry laundry with special caution. Don't shake dirty laundry, and use the warmest water setting you can.    If you have a weakened immune system, ask your doctor about other actions you should take.    For more tips, go to www.cdc.gov/coronavirus/2019-ncov/downloads/10Things.pdf.    You should not go back to work until you meet the guidelines above for ending your home isolation. You should meet these along with any other guidelines that your employer has.    Employers: This document serves as formal notice of your employee's medical guidelines for going back to work. They must meet the above guidelines before going back to work in person.    How can I take care of myself?    1. Get lots of rest. Drink extra fluids (unless a doctor has told you not to).    2. Take Tylenol (acetaminophen) for fever or pain. If you have liver or kidney problems, ask your family doctor if it's okay to take Tylenol.     Take either:     650 mg (two 325 mg pills) every 4 to 6 hours, or     1,000 mg (two 500 mg pills) every 8 hours as needed.     Note: Don't take more than 3,000 mg in one day. Acetaminophen is found in many medicines (both prescribed and over-the-counter medicines). Read all labels to be sure you don't take too much.    For children, check the Tylenol bottle for the right dose (based on their age or weight).    3. If you have other health problems (like cancer, heart failure, an organ transplant or severe kidney disease): Call your specialty clinic if you don't feel better in the next 2 days.    4. Know when to call 911: Emergency warning signs include:    Trouble breathing or shortness of breath    Pain or pressure in the chest that doesn't go away    Feeling confused like you haven't felt before, or not being able to wake up    Bluish-colored lips or face    5. Sign up for [employer]. We know it's scary to hear that you have COVID-19. We want to track your symptoms to make sure you're okay over  the next 2 weeks. Please look for an email from [employer]--this is a free, online program that we'll use to keep in touch. To sign up, follow the link in the email. Learn more at [email or web address].    Where can I get more information?    [facility]: [email or web address]    Coronavirus Basics: www.health.[facility].[place]./diseases/coronavirus/basics.html    What to Do If You're Sick: www.cdc.gov/coronavirus/2019-ncov/about/steps-when-sick.html    Ending Home Isolation: www.cdc.gov/coronavirus/2019-ncov/hcp/disposition-in-home-patients.html     Caring for Someone with COVID-19: www.cdc.gov/coronavirus/2019-ncov/if-you-are-sick/care-for-someone.html     [facility] clinical trials (COVID-19 research studies): clinicalaffairs.[facility]-clinical-trials     Positive COVID-19 letter sent via MyChart or mail (Yes/No):  Yes,  Sent by Helga Messina RN  [facility] [place]   Lung Nodule and ED Lab Result F/U RN  Epic pool (ED late result f/u RN): P [id number]  # [phone]

## 2020-07-17 NOTE — TELEPHONE ENCOUNTER
Coronavirus (COVID-19) Notification    Reason for call  Notify of POSITIVE  COVID-19 lab result, assess symptoms,  review [facility] recommendations    Lab Result   Lab test for 2019-nCoV rRt-PCR or SARS-COV-2 PCR  Oropharyngeal AND/OR nasopharyngeal swabs were POSITIVE for 2019-nCoV RNA [OR] SARS-COV-2 RNA (COVID-19) RNA     We have been unable to reach Patient by phone at this time to notify of their Positive COVID-19 result.  Left voicemail message requesting a call back between 8 am to 6:30 p.m. to [phone] [facility] for results.   (Weekends, this line is available from 10A to 6:30P)     POSITIVE COVID-19 Letter sent.    [Name]  Sarah Schlatter RN

## 2021-02-21 ENCOUNTER — COMMUNICATION - HEALTHEAST (OUTPATIENT)
Dept: FAMILY MEDICINE | Facility: CLINIC | Age: 26
End: 2021-02-21

## 2021-02-21 DIAGNOSIS — F43.23 ADJUSTMENT DISORDER WITH MIXED ANXIETY AND DEPRESSED MOOD: ICD-10-CM

## 2021-03-01 ENCOUNTER — COMMUNICATION - HEALTHEAST (OUTPATIENT)
Dept: FAMILY MEDICINE | Facility: CLINIC | Age: 26
End: 2021-03-01

## 2021-03-01 DIAGNOSIS — F43.23 ADJUSTMENT DISORDER WITH MIXED ANXIETY AND DEPRESSED MOOD: ICD-10-CM

## 2021-03-04 ENCOUNTER — OFFICE VISIT - HEALTHEAST (OUTPATIENT)
Dept: FAMILY MEDICINE | Facility: CLINIC | Age: 26
End: 2021-03-04

## 2021-03-04 DIAGNOSIS — F43.23 ADJUSTMENT DISORDER WITH MIXED ANXIETY AND DEPRESSED MOOD: ICD-10-CM

## 2021-03-04 DIAGNOSIS — Z13.220 ENCOUNTER FOR SCREENING FOR LIPOID DISORDERS: ICD-10-CM

## 2021-03-04 DIAGNOSIS — Z00.00 ENCOUNTER FOR GENERAL ADULT MEDICAL EXAMINATION WITHOUT ABNORMAL FINDINGS: ICD-10-CM

## 2021-03-04 DIAGNOSIS — B37.31 CANDIDIASIS OF VAGINA: ICD-10-CM

## 2021-03-04 DIAGNOSIS — Z11.51 SPECIAL SCREENING EXAMINATION FOR HUMAN PAPILLOMAVIRUS (HPV): ICD-10-CM

## 2021-03-04 LAB
ANION GAP SERPL CALCULATED.3IONS-SCNC: 10 MMOL/L (ref 5–18)
BUN SERPL-MCNC: 12 MG/DL (ref 8–22)
CALCIUM SERPL-MCNC: 9.5 MG/DL (ref 8.5–10.5)
CHLORIDE BLD-SCNC: 104 MMOL/L (ref 98–107)
CHOLEST SERPL-MCNC: 150 MG/DL
CO2 SERPL-SCNC: 26 MMOL/L (ref 22–31)
CREAT SERPL-MCNC: 0.82 MG/DL (ref 0.6–1.1)
FASTING STATUS PATIENT QL REPORTED: YES
GFR SERPL CREATININE-BSD FRML MDRD: >60 ML/MIN/1.73M2
GLUCOSE BLD-MCNC: 90 MG/DL (ref 70–125)
HDLC SERPL-MCNC: 92 MG/DL
HGB BLD-MCNC: 12.8 G/DL (ref 12–16)
LDLC SERPL CALC-MCNC: 50 MG/DL
POTASSIUM BLD-SCNC: 4.7 MMOL/L (ref 3.5–5)
SODIUM SERPL-SCNC: 140 MMOL/L (ref 136–145)
TRIGL SERPL-MCNC: 40 MG/DL

## 2021-03-04 ASSESSMENT — ANXIETY QUESTIONNAIRES
2. NOT BEING ABLE TO STOP OR CONTROL WORRYING: NEARLY EVERY DAY
5. BEING SO RESTLESS THAT IT IS HARD TO SIT STILL: NEARLY EVERY DAY
3. WORRYING TOO MUCH ABOUT DIFFERENT THINGS: NEARLY EVERY DAY
GAD7 TOTAL SCORE: 18
IF YOU CHECKED OFF ANY PROBLEMS ON THIS QUESTIONNAIRE, HOW DIFFICULT HAVE THESE PROBLEMS MADE IT FOR YOU TO DO YOUR WORK, TAKE CARE OF THINGS AT HOME, OR GET ALONG WITH OTHER PEOPLE: VERY DIFFICULT
1. FEELING NERVOUS, ANXIOUS, OR ON EDGE: NEARLY EVERY DAY
6. BECOMING EASILY ANNOYED OR IRRITABLE: MORE THAN HALF THE DAYS
4. TROUBLE RELAXING: NEARLY EVERY DAY
7. FEELING AFRAID AS IF SOMETHING AWFUL MIGHT HAPPEN: SEVERAL DAYS

## 2021-03-04 ASSESSMENT — PATIENT HEALTH QUESTIONNAIRE - PHQ9: SUM OF ALL RESPONSES TO PHQ QUESTIONS 1-9: 5

## 2021-03-04 ASSESSMENT — MIFFLIN-ST. JEOR: SCORE: 1413.8

## 2021-03-05 LAB
25(OH)D3 SERPL-MCNC: 28.9 NG/ML (ref 30–80)
25(OH)D3 SERPL-MCNC: 28.9 NG/ML (ref 30–80)

## 2021-03-08 LAB

## 2021-03-10 ENCOUNTER — COMMUNICATION - HEALTHEAST (OUTPATIENT)
Dept: FAMILY MEDICINE | Facility: CLINIC | Age: 26
End: 2021-03-10

## 2021-03-11 ENCOUNTER — COMMUNICATION - HEALTHEAST (OUTPATIENT)
Dept: FAMILY MEDICINE | Facility: CLINIC | Age: 26
End: 2021-03-11

## 2021-04-21 ENCOUNTER — COMMUNICATION - HEALTHEAST (OUTPATIENT)
Dept: ADMINISTRATIVE | Facility: CLINIC | Age: 26
End: 2021-04-21

## 2021-04-21 DIAGNOSIS — F41.1 GAD (GENERALIZED ANXIETY DISORDER): ICD-10-CM

## 2021-04-21 DIAGNOSIS — F43.23 ADJUSTMENT DISORDER WITH MIXED ANXIETY AND DEPRESSED MOOD: ICD-10-CM

## 2021-05-26 ASSESSMENT — PATIENT HEALTH QUESTIONNAIRE - PHQ9
SUM OF ALL RESPONSES TO PHQ QUESTIONS 1-9: 11
SUM OF ALL RESPONSES TO PHQ QUESTIONS 1-9: 18

## 2021-05-27 ASSESSMENT — PATIENT HEALTH QUESTIONNAIRE - PHQ9: SUM OF ALL RESPONSES TO PHQ QUESTIONS 1-9: 5

## 2021-05-28 ASSESSMENT — ANXIETY QUESTIONNAIRES
GAD7 TOTAL SCORE: 14
GAD7 TOTAL SCORE: 13
GAD7 TOTAL SCORE: 18

## 2021-05-28 NOTE — PROGRESS NOTES
Assessment:   1.  IUD check  2.  STD screen     Plan:   -Discussed danger signs and symptoms of the IUD including how to check for strings. Instructed patient to check her strings monthly. Discussed when/where to call with any fever, severe back pain, severe abdominal pain, heavy bleeding (soaking more than 1 pad per hour). Also encouraged to call if she does not feel her strings. She was advised to use Ibuprofen as needed for mild to moderate pain.   -Paragard IUD should be removed by 12/24/2028.  -Wet prep, GC/CT, RPR and HIV obtained today.  -Encouraged to return to clinic in 3 months for repeat lab work.  Discussed window of infectivity after potential STD exposure.    TT with patient 25 mns >50% time spent in counseling or coordination of care.     Subjective:       Timoteo Alamo is a 24 y.o. female who presents for IUD check. She had a Paragard inserted on 12/24/2018. Reports no complaints since insertion until this menstrual period.  Last menstrual period was 4/12/2019.  Recently became sexually active with a male partner after 6 months of no sexual activity.  Noted that her period is supposed to begin today, but vaginal spotting began at the beginning of this week.  Desires screening for sexually transmitted infections.  Uses condoms occasionally with this new partner.  Noticed vaginal odor this week with vaginal spotting.  Denies vaginal pruritus or irritation.  Bleeding has been light. Minimal cramping, well-controlled with Ibuprofen.     Review of Systems  Pertinent items are noted in HPI.      Objective:     Physical Exam:  General: Pleasant, articulate, well-groomed, well-nourished female.  Not in any apparent distress.  Abdomen: Soft, nontender, no masses palpated, negative CVAT.  External genitalia: Normal hair distribution, no lesions.  Urethral opening: Without lesions or discharge. No tenderness.   Bladder: Without masses, or tenderness.  Vagina: Pink, rugated, milky white homogenous discharge.   There is a small amount of dark red blood at the posterior fornix.  Cervix: nulliparous, pink, smooth, no lesions. IUD strings visualized and 3 cm in length.   Bimanual: small, mobile, nontender, no masses.  Negative CMT.  Adnexa, without masses or tenderness.

## 2021-05-29 ENCOUNTER — RECORDS - HEALTHEAST (OUTPATIENT)
Dept: ADMINISTRATIVE | Facility: CLINIC | Age: 26
End: 2021-05-29

## 2021-05-31 VITALS — BODY MASS INDEX: 27.23 KG/M2 | WEIGHT: 159.5 LBS | HEIGHT: 64 IN

## 2021-05-31 NOTE — PROGRESS NOTES
Assessment:   1. Adjustment disorder with mixed anxiety and depressed mood  Poorly controlled depression. Discussed the need to cut alcohol use both at night and during the weekend. Discussed the need to have therapy more frequent than twice monthly. Recommend adding Wellbutrin to current medication and follow up in two weeks.   - buPROPion (WELLBUTRIN XL) 150 MG 24 hr tablet; Take 1 tablet (150 mg total) by mouth every morning.  Dispense: 30 tablet; Refill: 2  Depression, unchanged        Plan:   Medications: Wellbutrin.  Continue with counseling.  Reviewed concept of anxiety as biochemical imbalance of neurotransmitters and rationale for treatment.  Instructed patient to contact office or on-call physician promptly should condition worsen or any new symptoms appear and provided on-call telephone numbers. IF THE PATIENT HAS ANY SUICIDAL OR HOMICIDAL IDEATIONS, CALL THE OFFICE, DISCUSS WITH A SUPPORT MEMBER, OR GO TO THE ER IMMEDIATELY. Patient was agreeable with this plan.  Follow up: 2 weeks.  Spent 25 minutes (>50% of visit) discussing the risks of anxiety disorder and depression, the  pathophysiology, etiology, risks, and principles of treatment.     Subjective:   Timoteo Alamo is a 24 y.o. female who presents for follow up of anxiety and depression. She reports that her anxiety is better but she is still depressed. She reports that she got a new job and she will be starting her masters program in a month. She reports that she is lonely a lot, she drink most night and she parties most weekend. Current symptoms include depressed mood, feelings of worthlessness/guilt and hopelessness. Symptoms have been unchanged since that time. Patient denies psychomotor agitation, psychomotor retardation, recurrent thoughts of death, suicidal attempt, suicidal thoughts with specific plan and suicidal thoughts without plan. Previous treatment includes: individual therapy and medication. She complains of the following side  effects from the treatment: none.    The following portions of the patient's history were reviewed and updated as appropriate: allergies, current medications, past family history, past medical history, past social history, past surgical history and problem list.    Review of Systems  A 12 point comprehensive review of systems was negative except as noted.      Objective:      /72   Pulse 77   Wt 139 lb 14.4 oz (63.5 kg)   LMP 07/23/2019 (Exact Date)   SpO2 98%   BMI 24.39 kg/m     General:  alert, appears stated age and cooperative   Affect & Behavior:  full facial expressions, good grooming, good insight, normal perception, normal reasoning, normal speech pattern and content and normal thought patterns

## 2021-05-31 NOTE — TELEPHONE ENCOUNTER
Refill Approved    Rx renewed per Medication Renewal Policy. Medication was last renewed on 7/29/19.    Jazmin Calix, [facility] Triage/Med Refill 8/14/2019     Requested Prescriptions   Pending Prescriptions Disp Refills     DULoxetine (CYMBALTA) 30 MG capsule [Pharmacy Med Name: DULoxetine 30MG     CAP] 28 capsule 0     Sig: TAKE 2 CAPSULES BY MOUTH ONCE DAILY       Tricyclics/Misc Antidepressant/Antianxiety Meds Refill Protocol Passed - 8/14/2019  8:56 AM        Passed - PCP or prescribing provider visit in last year     Last office visit with prescriber/PCP: 8/7/2019 Baylee Luevano FNP OR same dept: 8/7/2019 Baylee Luevano FNP OR same specialty: 8/7/2019 Baylee Luevano FNP  Last physical: 7/19/2017 Last MTM visit: Visit date not found   Next visit within 3 mo: Visit date not found  Next physical within 3 mo: Visit date not found  Prescriber OR PCP: ROSALINA Delgado  Last diagnosis associated with med order: 1. Adjustment disorder with mixed anxiety and depressed mood  - DULoxetine (CYMBALTA) 30 MG capsule [Pharmacy Med Name: DULoxetine 30MG     CAP]; TAKE 2 CAPSULES BY MOUTH ONCE DAILY  Dispense: 28 capsule; Refill: 0    If protocol passes may refill for 12 months if within 3 months of last provider visit (or a total of 15 months).

## 2021-06-01 VITALS — WEIGHT: 168.5 LBS | HEIGHT: 64 IN | BODY MASS INDEX: 28.77 KG/M2

## 2021-06-01 VITALS — WEIGHT: 168.5 LBS | BODY MASS INDEX: 29.38 KG/M2

## 2021-06-02 VITALS — WEIGHT: 151.25 LBS | BODY MASS INDEX: 26.37 KG/M2

## 2021-06-02 VITALS — WEIGHT: 160.06 LBS | BODY MASS INDEX: 27.91 KG/M2

## 2021-06-02 VITALS — BODY MASS INDEX: 27.55 KG/M2 | WEIGHT: 158 LBS

## 2021-06-02 VITALS — WEIGHT: 164 LBS | BODY MASS INDEX: 28 KG/M2 | HEIGHT: 64 IN

## 2021-06-02 NOTE — TELEPHONE ENCOUNTER
Prior Authorization Request  Who s requesting:  Pharmacy  Pharmacy Name and Location: [facility]  Medication Name:   DULoxetine (CYMBALTA) 30 MG capsule 180 capsule 3 8/14/2019  No   Sig: TAKE 2 CAPSULES BY MOUTH ONCE DAILY       Insurance Plan: [employer]  Insurance Member ID Number:  [id number]  Informed patient that prior authorizations can take up to 10 business days for response:   No  Okay to leave a detailed message: Yes

## 2021-06-02 NOTE — TELEPHONE ENCOUNTER
"Triage call:   Dizzy spells and \"feels weird\" - indicates that it feels like a \"shock through her system\".   Starts in her head and goes to her feet, feels dizzy for a few seconds and the feeling passes.   She indicates that the feeling is overwhelming and almost like she will pass out but doesn't.     Today has been happening frequently/constant- reports it was intermittently the last couple days.     Patient is concerned it may be related her her medications: Cymbalta and Wellbutrin     Drinking adequate fluids    Denies additional symptoms at this time.    Triaged to be seen today. Reviewed additional care advice with patient and she verbalizes understanding. Patient warm transferred to scheduling for appointment. Patient either hung up or was disconnected prior to speaking to scheduling. Called patient back and left her a message to be seen in the [facility] as there were no late appointments available today. Advised to call back with additional questions.      Kelly Cason RN [facility] Triage/Med Refill 10/8/2019 10:59 AM       Reason for Disposition    Patient wants to be seen    Protocols used: DIZZINESS-A-[place]      "

## 2021-06-03 VITALS — BODY MASS INDEX: 25.78 KG/M2 | HEIGHT: 64 IN | WEIGHT: 151 LBS

## 2021-06-03 VITALS — WEIGHT: 139.9 LBS | BODY MASS INDEX: 24.39 KG/M2

## 2021-06-03 NOTE — PROGRESS NOTES
Assessment:   1. Adjustment disorder with mixed anxiety and depressed mood  2. SIDNEY (generalized anxiety disorder)  Discussed general issues surrounding anxiety and depression and mental health in general.  Also discussed disease pathophysiology.  Emphasized the need to take medications constantly at the same dose of 90 mg of Cymbalta daily.  Recommend adding buspirone 3 times a day for anxiety.  Patient will continue with counseling and will follow-up with me in 2 to 3 weeks.  - DULoxetine (CYMBALTA) 30 MG capsule; Take 1 capsule (30 mg total) by mouth 3 (three) times a day.  Dispense: 90 capsule; Refill: 3  - busPIRone (BUSPAR) 10 MG tablet; Take 1 tablet (10 mg total) by mouth 3 (three) times a day.  Dispense: 90 tablet; Refill: 0      Plan:   Medications: BuSpar and Cymbalta.  Continue with counseling.  Handouts describing disease, natural history, and treatment were not given to the patient.  Reviewed concept of anxiety as biochemical imbalance of neurotransmitters and rationale for treatment.  Instructed patient to contact office or on-call physician promptly should condition worsen or any new symptoms appear and provided on-call telephone numbers. IF THE PATIENT HAS ANY SUICIDAL OR HOMICIDAL IDEATIONS, CALL THE OFFICE, DISCUSS WITH A SUPPORT MEMBER, OR GO TO THE ER IMMEDIATELY. Patient was agreeable with this plan.  Follow up: 2 weeks.  Spent 25 minutes (>50% of visit) discussing the risks of anxiety disorder, panic attacks and depression, the  pathophysiology, etiology, risks, and principles of treatment.     Subjective:   Timoteo Alamo is a 24 y.o. female who presents for follow up of depression. She reports that she has she has been rationalizing her with those for a while and recently she was out of her medication for 2 weeks which led to increased depression and anxiety.  She stated that yesterday she could not continue at work because of how she was feeling but she was able to  her medications  yesterday and she took a dose yesterday afternoon.  She is here today complaining that she is more depressed which is likely due to the fact that she moved away from home and she is now on her own and she has nobody to talk to. She sates that she feels lonely and depressed most of the time.  She does have a roommate but she does not know this person very well.  She stated that her work is also very stressful which is also increases her anxiety and depression.  Patient stated that yesterday she did have some suicidal thoughts but not today.  She stated that it is difficult sometimes to get out of the bed or to go to work or do the things she wants to do due to the depression.  Patient denies psychomotor agitation, psychomotor retardation, recurrent thoughts of death, suicidal attempt and suicidal thoughts with specific plan. Previous treatment includes: individual therapy and medication. She complains of the following side effects from the treatment: none.    The following portions of the patient's history were reviewed and updated as appropriate: allergies, current medications, past family history, past medical history, past social history, past surgical history and problem list.    Review of Systems  A 12 point comprehensive review of systems was negative except as noted.      Objective:      /80   Pulse 88   Wt 136 lb 5 oz (61.8 kg)   SpO2 97%   BMI 23.77 kg/m     General:  alert, appears stated age and cooperative   Affect & Behavior:  full facial expressions, good grooming, normal reasoning and normal thought patterns flattened affect and slowed speech, crying most of the visit

## 2021-06-04 VITALS
OXYGEN SATURATION: 100 % | SYSTOLIC BLOOD PRESSURE: 116 MMHG | WEIGHT: 135 LBS | DIASTOLIC BLOOD PRESSURE: 85 MMHG | HEART RATE: 83 BPM | BODY MASS INDEX: 23.54 KG/M2

## 2021-06-04 VITALS
WEIGHT: 136.31 LBS | OXYGEN SATURATION: 97 % | BODY MASS INDEX: 23.77 KG/M2 | HEART RATE: 88 BPM | SYSTOLIC BLOOD PRESSURE: 110 MMHG | DIASTOLIC BLOOD PRESSURE: 80 MMHG

## 2021-06-04 NOTE — PROGRESS NOTES
Assessment:   1. Adjustment disorder with mixed anxiety and depressed mood  2. SIDNEY (generalized anxiety disorder)  Symptoms have improved.  Emphasized the need to continue medications and also psychotherapy.  Patient will follow-up in 2 weeks.  - busPIRone (BUSPAR) 10 MG tablet; Take 1 tablet (10 mg total) by mouth 3 (three) times a day.  Dispense: 90 tablet; Refill: 3  - busPIRone (BUSPAR) 10 MG tablet; Take 1 tablet (10 mg total) by mouth 3 (three) times a day.  Dispense: 90 tablet; Refill: 3      Plan:   Medications: BuSpar and Cymbalta.  Continue with counseling.  Reviewed concept of anxiety as biochemical imbalance of neurotransmitters and rationale for treatment.  Instructed patient to contact office or on-call physician promptly should condition worsen or any new symptoms appear and provided on-call telephone numbers. IF THE PATIENT HAS ANY SUICIDAL OR HOMICIDAL IDEATIONS, CALL THE OFFICE, DISCUSS WITH A SUPPORT MEMBER, OR GO TO THE ER IMMEDIATELY. Patient was agreeable with this plan.  Follow up: 2 weeks.  Spent 25 minutes (>50% of visit) discussing the risks of anxiety disorder and depression, the  pathophysiology, etiology, risks, and principles of treatment.     Subjective:   Timoteo Alamo is a 24 y.o. female who presents for follow up of depression.  Patient reported that she is feeling much better since restarting her Cymbalta and the addition of BuSpar.  Patient reported that the holidays are little bit hard for her since she had fibroids no longer around.  She stated that she has a hard time relating with her mom as the mom does not want to see her anymore.  She is born to her mother who is originally from [place] and her dad from [place] alone.  She stated that she has three other brothers but her mom relates well with the three boys but does not relate well with her.  She stated that this is due to the fact that her mom and dad had a lot of issues while her dad was still alive.  She stated sometimes  she feels lonely but she does have some places to go to during this holiday.  Patient reports current symptoms include depressed mood and Night terrors.  She reported that since she got a text message from her SVN see that she is been having more night terrors.   Patient denies psychomotor agitation, psychomotor retardation, recurrent thoughts of death, suicidal attempt, suicidal thoughts with specific plan, suicidal thoughts without plan, weight gain and weight loss.  She complains of the following side effects from the treatment: none.    The following portions of the patient's history were reviewed and updated as appropriate: allergies, current medications, past family history, past medical history, past social history, past surgical history and problem list.    Review of Systems  A 12 point comprehensive review of systems was negative except as noted.      Objective:      /85   Pulse 83   Wt 135 lb (61.2 kg)   SpO2 100%   BMI 23.54 kg/m     General:  alert, appears stated age and cooperative   Affect & Behavior:  full facial expressions, good grooming, good insight, normal perception, normal reasoning, normal speech pattern and content and normal thought patterns

## 2021-06-04 NOTE — TELEPHONE ENCOUNTER
Patient Returning Call  Reason for call:  Return call  Information relayed to patient:  Patient was asked how she is doing and that she is due for a follow up with Baylee Luevano FNP.  Patient has additional questions:  No  If YES, what are your questions/concerns:  n/a  Okay to leave a detailed message?: No call back needed     Patient stated she is doing a lot better. Patient stated she feels more stable, is able to work the full day, and the breathing and meditations her therapist suggested, are helping as well. Patient stated her family is a huge trigger but is breathing and meditations are helping with that. Patient stated she is still having issues with focusing on things such as assignments.    Patient was transferred to scheduling to make an appointment.

## 2021-06-04 NOTE — TELEPHONE ENCOUNTER
Left message for patient to call back. If patient calls back, please relay message below.    Third attempt in trying to reach out to patient. Closing encounter. [employer] message sent

## 2021-06-04 NOTE — TELEPHONE ENCOUNTER
LM for patient to return call to clinic.  Looking for an update on her condition and to help setup a follow up appointment. Noah aBlderas CMA

## 2021-06-04 NOTE — TELEPHONE ENCOUNTER
Left message for patient to call back. If patient calls back, please relay message below.    Finishing form for FMLA. What dates did the patient miss for work?

## 2021-06-05 VITALS
DIASTOLIC BLOOD PRESSURE: 73 MMHG | HEART RATE: 87 BPM | RESPIRATION RATE: 97 BRPM | WEIGHT: 147.25 LBS | BODY MASS INDEX: 24.53 KG/M2 | HEIGHT: 65 IN | SYSTOLIC BLOOD PRESSURE: 104 MMHG

## 2021-06-08 ENCOUNTER — COMMUNICATION - HEALTHEAST (OUTPATIENT)
Dept: FAMILY MEDICINE | Facility: CLINIC | Age: 26
End: 2021-06-08

## 2021-06-08 DIAGNOSIS — B37.31 CANDIDIASIS OF VAGINA: ICD-10-CM

## 2021-06-08 NOTE — TELEPHONE ENCOUNTER
Refill Approved    Rx renewed per Medication Renewal Policy. Medication was last renewed on 12/19/19.    Jazmin Calix, [facility] Triage/Med Refill 5/8/2020     Requested Prescriptions   Pending Prescriptions Disp Refills     busPIRone (BUSPAR) 10 MG tablet [Pharmacy Med Name: busPIRone HCl 10 MG Oral Tablet] 90 tablet 0     Sig: TAKE 1 TABLET BY MOUTH THREE TIMES DAILY       Tricyclics/Misc Antidepressant/Antianxiety Meds Refill Protocol Passed - 5/7/2020  2:28 PM        Passed - PCP or prescribing provider visit in last year     Last office visit with prescriber/PCP: 12/19/2019 Baylee Luevano FNP OR same dept: 12/19/2019 Baylee Luevano FNP OR same specialty: 12/19/2019 Baylee Luevano FNP  Last physical: 7/19/2017 Last MTM visit: Visit date not found   Next visit within 3 mo: Visit date not found  Next physical within 3 mo: Visit date not found  Prescriber OR PCP: ROSALINA Delgado  Last diagnosis associated with med order: 1. Adjustment disorder with mixed anxiety and depressed mood  - busPIRone (BUSPAR) 10 MG tablet [Pharmacy Med Name: busPIRone HCl 10 MG Oral Tablet]; TAKE 1 TABLET BY MOUTH THREE TIMES DAILY  Dispense: 90 tablet; Refill: 0    2. SIDNEY (generalized anxiety disorder)  - busPIRone (BUSPAR) 10 MG tablet [Pharmacy Med Name: busPIRone HCl 10 MG Oral Tablet]; TAKE 1 TABLET BY MOUTH THREE TIMES DAILY  Dispense: 90 tablet; Refill: 0    If protocol passes may refill for 12 months if within 3 months of last provider visit (or a total of 15 months).

## 2021-06-08 NOTE — TELEPHONE ENCOUNTER
Refill Approved    Rx renewed per Medication Renewal Policy. Medication was last renewed on 11/26/19.    Kelly Cason, [facility] Triage/Med Refill 5/22/2020     Requested Prescriptions   Pending Prescriptions Disp Refills     DULoxetine (CYMBALTA) 30 MG capsule [Pharmacy Med Name: DULoxetine HCl 30 MG Oral Capsule Delayed Release Particles] 90 capsule 0     Sig: TAKE 1 CAPSULE BY MOUTH THREE TIMES DAILY       Tricyclics/Misc Antidepressant/Antianxiety Meds Refill Protocol Passed - 5/20/2020 10:04 AM        Passed - PCP or prescribing provider visit in last year     Last office visit with prescriber/PCP: 12/19/2019 Baylee Luevano FNP OR same dept: 12/19/2019 Baylee Luevano FNP OR same specialty: 12/19/2019 Baylee Luevano FNP  Last physical: 7/19/2017 Last MTM visit: Visit date not found   Next visit within 3 mo: Visit date not found  Next physical within 3 mo: Visit date not found  Prescriber OR PCP: ROSALINA Delgado  Last diagnosis associated with med order: 1. Adjustment disorder with mixed anxiety and depressed mood  - DULoxetine (CYMBALTA) 30 MG capsule [Pharmacy Med Name: DULoxetine HCl 30 MG Oral Capsule Delayed Release Particles]; TAKE 1 CAPSULE BY MOUTH THREE TIMES DAILY  Dispense: 90 capsule; Refill: 0    If protocol passes may refill for 12 months if within 3 months of last provider visit (or a total of 15 months).

## 2021-06-10 ENCOUNTER — COMMUNICATION - HEALTHEAST (OUTPATIENT)
Dept: FAMILY MEDICINE | Facility: CLINIC | Age: 26
End: 2021-06-10

## 2021-06-10 DIAGNOSIS — B37.31 CANDIDIASIS OF VAGINA: ICD-10-CM

## 2021-06-11 NOTE — PROGRESS NOTES
Assessment:   1. Annual physical exam  Healthy female exam  - Thyroid Cascade  - HM2(CBC w/o Differential)  - Basic Metabolic Panel; Future  - Gynecologic Cytology (PAP Smear)  - Basic Metabolic Panel    2. STD (female)  - Wet Prep, Vaginal  - Chlamydia trachomatis & Neisseria gonorrhoeae, Amplified Detection    3. Vaginal irritation  Wet prep was positive for clue cells and yeast.  Initiate treatment with azole.  - Wet Prep, Vaginal  - Chlamydia trachomatis & Neisseria gonorrhoeae, Amplified Detection  - metroNIDAZOLE (FLAGYL) 500 MG tablet; Take 1 tablet (500 mg total) by mouth 2 (two) times a day for 7 days.  Dispense: 14 tablet; Refill: 0  If symptoms persist we plan to treat for long time using metronidazole gel twice weekly dosing of gel for four to six months    4. Bacterial vaginosis  - metroNIDAZOLE (FLAGYL) 500 MG tablet; Take 1 tablet (500 mg total) by mouth 2 (two) times a day for 7 days.  Dispense: 14 tablet; Refill: 0     Plan:       All questions answered.  Blood tests: CBC with diff, Comprehensive metabolic panel, Total cholesterol and TSH.  Breast self exam technique reviewed and patient encouraged to perform self-exam monthly.  Follow up as needed.  HSV specimen.  Pap smear.  Wet prep.     Subjective:   Timoteo Alamo is a 22 y.o. female who presents for an annual exam. The patient IS sexually active. The patient participates in regular exercise: YES. The patient reports that there NO domestic violence in her life.  Patient reported that she recently lost her father to cancer and he was just buried about a week ago.  Patient stated that she does have a history of anxiety and depression and panic attacks.  She stated that she sees a psychiatrist at the [facility] which was where she was diagnosed and she is taking citalopram which has been helpful but she has not taken it for the past 1 week.  Patient also reported that she has had symptoms of yeast infection for the past 1 month and she has been  treated with oral azole and also topical but it keeps coming back.  She would like to be tested for yeast infection or possible bacterial vaginosis.  She reported her symptoms white drainage which is very unusual.  She denied itching, pain with urination and swelling of her vagina.    Healthy Habits:   Regular Exercise: Yes  Sunscreen Use: No  Healthy Diet: No  Dental Visits Regularly: No  Seat Belt: Yes  Sexually active: Yes  Self Breast Exam Monthly:No  Hemoccults: N/A  Flex Sig: N/A  Colonoscopy: N/A  Lipid Profile: N/A  Glucose Screen: N/A  Prevention of Osteoporosis: N/A  Last Dexa: N/A  Guns at Home:  No      Immunization History   Administered Date(s) Administered     DTaP, historic 1996, 1996, 1996, 09/27/1997, 02/15/2001     HPV Quadrivalent 09/29/2009, 04/20/2011, 11/16/2011     Hep A, historic 08/22/2007, 07/16/2008     Hep B, historic 1996, 1996, 1996     HiB, historic 1996, 1996, 1996, 05/12/1997     IPV 1996, 1996, 1996, 02/15/2001     Influenza, Seasonal, Inj PF 11/16/2011     Influenza, seasonal,quad inj 6-35 mos 09/29/2009     MMR 02/03/1997, 02/15/2001     Meningococcal MCV4P 08/22/2007, 08/06/2014     Tdap 08/22/2007     Typhoid, ViCPs 04/20/2011     Varicella 05/12/1997, 08/22/2007     Immunization status: up to date and documented, stated as current, but no records available.    No exam data present    Gynecologic History  Patient's last menstrual period was 06/15/2017.  Contraception: Implant  Last Pap: N/A.  Last mammogram: N/A    OB History   No data available       Current Outpatient Prescriptions   Medication Sig Dispense Refill     ascorbic acid (ASCORBIC ACID WITH DAVID HIPS) 500 MG tablet Take 500 mg by mouth daily.       biotin 1 mg cap Take by mouth.       calcium-vitamin D (CALCIUM-VITAMIN D) 500 mg(1,250mg) -200 unit per tablet Take 1 tablet by mouth as needed.       No current facility-administered medications  "for this visit.      No past medical history on file.  No past surgical history on file.  Review of patient's allergies indicates no known allergies.  No family history on file.  Social History     Social History     Marital status: Single     Spouse name: N/A     Number of children: N/A     Years of education: N/A     Occupational History     Not on file.     Social History Main Topics     Smoking status: Never Smoker     Smokeless tobacco: Not on file      Comment: No exposure to secondhand smoke.     Alcohol use No     Drug use: No     Sexual activity: Not on file     Other Topics Concern     Not on file     Social History Narrative    Parents Jordi and Alberta       Review of Systems  General:  Denies problem  Eyes: Denies problem  Ears/Nose/Throat: Denies problem  Cardiovascular: Denies problem  Respiratory:  Denies problem  Gastrointestinal:  Denies problem, Genitourinary: Denies problem  Musculoskeletal:  Denies problem  Skin: Denies problem  Neurologic: Denies problem  Psychiatric: Denies problem  Endocrine: Denies problem  Heme/Lymphatic: Denies problem   Allergic/Immunologic: Denies problem        Objective:         Vitals:    07/19/17 1132   BP: 112/68   Pulse: 74   SpO2: 96%   Weight: 159 lb 8 oz (72.3 kg)   Height: 5' 3.5\" (1.613 m)     Body mass index is 27.81 kg/(m^2).    Physical Exam:  General Appearance: Alert, cooperative, no distress, appears stated age  Head: Normocephalic, without obvious abnormality, atraumatic  Eyes: PERRL, conjunctiva/corneas clear, EOM's intact  Ears: Normal TM's and external ear canals, both ears  Nose: Nares normal, septum midline,mucosa normal, no drainage  Throat: Lips, mucosa, and tongue normal; teeth and gums normal  Neck: Supple, symmetrical, trachea midline, no adenopathy;  thyroid: not enlarged, symmetric, no tenderness/mass/nodules; no carotid bruit or JVD  Back: Symmetric, no curvature, ROM normal, no CVA tenderness  Lungs: Clear to auscultation bilaterally, " respirations unlabored  Breasts: No breast masses, tenderness, asymmetry, or nipple discharge.  Heart: Regular rate and rhythm, S1 and S2 normal, no murmur, rub, or gallop,   Abdomen: Soft, non-tender, bowel sounds active all four quadrants,  no masses, no organomegaly  Pelvic:Normally developed genitalia with no external lesions or eruptions. Vagina and cervix show no lesions, inflammation, but white discharge was noted. No tenderness. No cystocele, No rectocele. Uterus normal.  No adnexal mass or tenderness.  Extremities: Extremities normal, atraumatic, no cyanosis or edema  Skin: Skin color, texture, turgor normal, no rashes or lesions  Lymph nodes: Cervical, supraclavicular, and axillary nodes normal  Neurologic: Normal

## 2021-06-15 NOTE — TELEPHONE ENCOUNTER
RN cannot approve Refill Request    RN can NOT refill this medication PCP messaged that patient is overdue for Office Visit. Last office visit: 12/19/2019 Baylee Luevano FNP Last Physical: 7/19/2017 Last MTM visit: Visit date not found Last visit same specialty: 12/19/2019 Baylee Luevano FNP.  Next visit within 3 mo: Visit date not found  Next physical within 3 mo: Visit date not found      Concha John, [facility] Triage/Med Refill 3/1/2021    Requested Prescriptions   Pending Prescriptions Disp Refills     DULoxetine (CYMBALTA) 30 MG capsule [Pharmacy Med Name: DULoxetine HCl 30 MG Oral Capsule Delayed Release Particles] 90 capsule 0     Sig: TAKE 1 CAPSULE BY MOUTH THREE TIMES DAILY       Tricyclics/Misc Antidepressant/Antianxiety Meds Refill Protocol Failed - 3/1/2021  9:52 AM        Failed - PCP or prescribing provider visit in last year     Last office visit with prescriber/PCP: 12/19/2019 Baylee Luevano FNP OR same dept: Visit date not found OR same specialty: 12/19/2019 Baylee Luevano FNP  Last physical: 7/19/2017 Last MTM visit: Visit date not found   Next visit within 3 mo: Visit date not found  Next physical within 3 mo: Visit date not found  Prescriber OR PCP: ROSALINA Delgado  Last diagnosis associated with med order: 1. Adjustment disorder with mixed anxiety and depressed mood  - DULoxetine (CYMBALTA) 30 MG capsule [Pharmacy Med Name: DULoxetine HCl 30 MG Oral Capsule Delayed Release Particles]; TAKE 1 CAPSULE BY MOUTH THREE TIMES DAILY  Dispense: 90 capsule; Refill: 0    If protocol passes may refill for 12 months if within 3 months of last provider visit (or a total of 15 months).

## 2021-06-15 NOTE — TELEPHONE ENCOUNTER
----- Message from Ciera Vickers RN sent at 3/10/2021  3:37 PM CST -----  Hi Promise,    25 yo pt with NIL pap. There was an incidental finding on her pap smear consistent with Candida spp. If follow up from this incidental finding is needed, please forward to your nurse team as incidental findings are not handled by the pap team.    Will recommend repeat pap in 3 years. Thanks!    Ciera Vickers RN BSN, Pap Tracking

## 2021-06-15 NOTE — TELEPHONE ENCOUNTER
RN cannot approve Refill Request    RN can NOT refill this medication PCP messaged that patient is overdue for Office Visit. Last office visit: 12/19/2019 Baylee Luevano FNP Last Physical: 7/19/2017 Last MTM visit: Visit date not found Last visit same specialty: 12/19/2019 Baylee Luevano FNP.  Next visit within 3 mo: Visit date not found  Next physical within 3 mo: Visit date not found      Concha John, [facility] Triage/Med Refill 3/1/2021    Requested Prescriptions   Pending Prescriptions Disp Refills     DULoxetine (CYMBALTA) 30 MG capsule 30 capsule 0     Sig: Take 1 capsule (30 mg total) by mouth 3 (three) times a day.       Tricyclics/Misc Antidepressant/Antianxiety Meds Refill Protocol Failed - 3/1/2021  9:29 AM        Failed - PCP or prescribing provider visit in last year     Last office visit with prescriber/PCP: 12/19/2019 Baylee Luevano FNP OR same dept: Visit date not found OR same specialty: 12/19/2019 Baylee Luevano FNP  Last physical: 7/19/2017 Last MTM visit: Visit date not found   Next visit within 3 mo: Visit date not found  Next physical within 3 mo: Visit date not found  Prescriber OR PCP: ROSALINA Delgado  Last diagnosis associated with med order: 1. Adjustment disorder with mixed anxiety and depressed mood  - DULoxetine (CYMBALTA) 30 MG capsule; Take 1 capsule (30 mg total) by mouth 3 (three) times a day.  Dispense: 30 capsule; Refill: 0    If protocol passes may refill for 12 months if within 3 months of last provider visit (or a total of 15 months).

## 2021-06-15 NOTE — TELEPHONE ENCOUNTER
Called informed patient of her pap result and the incidental finding of candida. Recommend treatment with antifungi.

## 2021-06-16 PROBLEM — F43.23 ADJUSTMENT DISORDER WITH MIXED ANXIETY AND DEPRESSED MOOD: Status: ACTIVE | Noted: 2019-02-28

## 2021-06-16 NOTE — TELEPHONE ENCOUNTER
Refill Request  Medication name:   Requested Prescriptions     Pending Prescriptions Disp Refills     busPIRone (BUSPAR) 10 MG tablet 270 tablet 2     Sig: Take 1 tablet (10 mg total) by mouth 3 (three) times a day.     Who prescribed the medication: PCP  Last refill on medication:   Requested Pharmacy: [facility]  Last appointment with PCP: 3/4/21  Next appointment: Not due

## 2021-06-16 NOTE — TELEPHONE ENCOUNTER
Telephone Encounter by Tamiko Ortez at 10/22/2019 10:55 AM     Author: Tamiko Ortez Service: -- Author Type: --    Filed: 10/22/2019 10:55 AM Encounter Date: 10/22/2019 Status: Signed    : Tamiko Ortez       [facility] team  [phone]  Pool: [facility] ([id number])          PA has been initiated.             Response will be received via fax and may take up to 5-10 business days depending on plan

## 2021-06-16 NOTE — TELEPHONE ENCOUNTER
Reason for Call:  Medication or medication refill:    Do you use a [facility]?  Name of the pharmacy and phone number for the current request: [facility] in [place]    Name of the medication requested: busPIRone (BUSPAR) 10 MG tablet    Other request: Pt was seen on 3/4, provider was going to send in refills but it didn't get done. She is getting low on meds    Can we leave a detailed message on this number? Yes    Phone number patient can be reached at: Home number on file [phone] (home)    Best Time: anytime    Call taken on 4/21/2021 at 9:21 AM by Venice Poole

## 2021-06-16 NOTE — TELEPHONE ENCOUNTER
Telephone Encounter by Tamiko Ortez at 10/25/2019 11:36 AM     Author: Tamiko Ortez Service: -- Author Type: --    Filed: 10/25/2019 11:37 AM Encounter Date: 10/22/2019 Status: Signed    : Tamiko Ortez       No PA needed, this was filled at the pharmacy per insurance already with a paid claim.  No further action needed.

## 2021-06-17 NOTE — PATIENT INSTRUCTIONS - HE
Patient Instructions by Clementina Bales CNP at 2/1/2019  8:50 AM     Author: Clementina Bales CNP Service: -- Author Type: Nurse Practitioner    Filed: 2/1/2019  9:42 AM Encounter Date: 2/1/2019 Status: Addendum    : Clementina Bales CNP (Nurse Practitioner)    Related Notes: Original Note by Clementina Bales CNP (Nurse Practitioner) filed at 2/1/2019  9:42 AM         Patient Education     Viral Gastroenteritis (Adult)    Gastroenteritis is commonly called the stomach flu. It is most often caused by a virus that affects the stomach and intestinal tract and usually lasts from 2 to 7 days. Common viruses causing gastroenteritis include norovirus, rotavirus, and hepatitis A. Non-viral causes of gastroenteritis include bacteria, parasites, and toxins.  The danger from repeated vomiting or diarrhea is dehydration. This is the loss of too much fluid from the body. When this occurs, body fluids must be replaced. Antibiotics do not help with this illness because it is usually viral.Simple home treatment will be helpful.  Symptoms of viral gastroenteritis may include:    Watery, loose stools    Stomach pain or abdominal cramps    Fever and chills    Nausea and vomiting    Loss of bowel control    Headache  Home care  Gastroenteritis is transmitted by contact with the stool or vomit of an infected person. This can occur from person to person or from contact with a contaminated surface.  Follow these guidelines when caring for yourself at home:    If symptoms are severe, rest at home for the next 24 hours or until you are feeling better.    Wash your hands with soap and water or use alcohol-based  to prevent the spread of infection. Wash your hands after touching anyone who is sick.    Wash your hands or use alcohol-based  after using the toilet and before meals. Clean the toilet after each use.  Remember these tips when preparing food:    People with diarrhea should not  prepare or serve food to others. When preparing foods, wash your hands before and after.    Wash your hands after using cutting boards, countertops, knives, or utensils that have been in contact with raw food.    Keep uncooked meats away from cooked and ready-to-eat foods.  Medicine  You may use acetaminophen or NSAID medicines like ibuprofen or naproxen to control fever unless another medicine was given. If you have chronic liver or kidney disease, talk with your healthcare provider before using these medicines. Also talk with your provider if you've had a stomach ulcer or gastrointestinal bleeding. Don't give aspirin to anyone under 18 years of age who is ill with a fever. It may cause severe liver damage. Don't use NSAIDS is you are already taking one for another condition (like arthritis) or are on aspirin (such as for heart disease or after a stroke).  If medicine for vomiting or diarrhea are prescribed, take these only as directed. Do not take over-the-counter medicines for vomiting or diarrhea unless instructed by your healthcare provider.  Diet  Follow these guidelines for food:    Water and liquids are important so you don't get dehydrated. Drink a small amount at a time or suck on ice chips if you are vomiting.    If you eat, avoid fatty, greasy, spicy, or fried foods.    Don't eat dairy if you have diarrhea. This can make diarrhea worse.    Avoid tobacco, alcohol, and caffeine which may worsen symptoms.  During the first 24 hours (the first full day), follow the diet below:    Beverages. Sports drinks, soft drinks without caffeine, ginger ale, mineral water (plain or flavored), decaffeinated tea and coffee. If you are very dehydrated, sports drinks aren't a good choice. They have too much sugar and not enough electrolytes. In this case, commercially available products called oral rehydration solutions, are best.    Soups. Eat clear broth, consommé, and bouillon.    Desserts. Eat gelatin, popsicles, and  fruit juice bars.  During the next 24 hours (the second day), you may add the following to the above:    Hot cereal, plain toast, bread, rolls, and crackers    Plain noodles, rice, mashed potatoes, chicken noodle or rice soup    Unsweetened canned fruit (avoid pineapple), bananas    Limit fat intake to less than 15 grams per day. Do this by avoiding margarine, butter, oils, mayonnaise, sauces, gravies, fried foods, peanut butter, meat, poultry, and fish.    Limit fiber and avoid raw or cooked vegetables, fresh fruits (except bananas), and bran cereals.    Limit caffeine and chocolate. Don't use spices or seasonings other than salt.    Limit dairy products.    Avoid alcohol.  During the next 24 hours:    Gradually resume a normal diet as you feel better and your symptoms improve.    If at any time it starts getting worse again, go back to clear liquids until you feel better.  Follow-up care  Follow up with your healthcare provider, or as advised. Call your provider if you don't get better within 24 hours or if diarrhea lasts more than a week. Also follow up if you are unable to keep down liquids and get dehydrated. If a stool (diarrhea) sample was taken, call as directed for the results.  Call 911  Call 911 if any of these occur:    Trouble breathing    Chest pain    Confused    Severe drowsiness or trouble awakening    Fainting or loss of consciousness    Rapid heart rate    Seizure    Stiff neck  When to seek medical advice  Call your healthcare provider right away if any of these occur:    Abdominal pain that gets worse    Continued vomiting (unable to keep liquids down)    Frequent diarrhea (more than 5 times a day)    Blood in vomit or stool (black or red color)    Dark urine, reduced urine output, or extreme thirst    Weakness or dizziness    Drowsiness    Fever of 100.4 F (38 C) or higher, or as directed by your healthcare provider    New rash  Date Last Reviewed: 1/3/2016    [id number] [employer]  [employer]. [address] [place] [address]. All rights reserved. This information is not intended as a substitute for professional medical care. Always follow your healthcare professional's instructions.           Patient Education     Self-Care for Vomiting and Diarrhea    Vomiting and diarrhea can make you miserable. Your stomach and bowels are reacting to an irritant. This might be food, medicine, or viral stomach flu. Vomiting and diarrhea are two ways your body can remove the problem from your system. Nausea is a symptom that discourages you from eating. This gives your stomach and bowels time to recover. To get back to normal, start with self-care to ease your discomfort.  Drink liquids  Drink or sip liquids to avoid losing too much fluid (dehydration):    Clear liquids such as water or broth are the best choices.    Do not drink beverages with a lot of sugar in them, such as juices and sodas. These can make diarrhea worse.    If you have severe vomiting, do not drink sport drinks, such as electrolyte solutions. These don't have the right mix of water, sugar, and minerals. They can also make the symptoms worse. In this situation, commercially available oral rehydration solutions are best.     Suck on ice chips if the thought of drinking something makes you queasy.  When youre able to eat again  Tips include the following:    As your appetite comes back, you can resume your normal diet.    Ask your healthcare provider whether you should stay away from any foods.  Medicines  Know the following about medicines:    Vomiting and diarrhea are ways your body uses to rid itself of harmful substances such as bacteria. DO NOT use antidiarrheal or antivomiting (antiemetic) medicines unless your healthcare provider tells you to do so.    Aspirin, medicine with aspirin, and many aspirin substitutes can irritate your stomach. So avoid them when you have stomach upset.    Certain prescription and over-the-counter  medicines can cause vomiting and diarrhea. Talk with your healthcare provider about any medicines you take that may be causing these symptoms.    Certain over-the-counter antihistamines can help control nausea. Other medicines can help soothe stomach upset. Ask your healthcare provider which medicines may help you.  When to call your healthcare provider  Call your healthcare provider if you have:    Bloody or black vomit or stools    Severe, steady belly pain    Vomiting with a severe headache or vomiting after a head injury    Vomiting and diarrhea together for more than an hour    An inability to hold down even sips of liquids for more than 12 hours    Vomiting that lasts more than 24 hours    Severe diarrhea that lasts more than 2 days    Yellowish color to your skin or the whites of your eyes    Inability to urinate. In infants and young children, not making wet diapers.    Date Last Reviewed: 6/1/2016 2000-2017 [employer]. [address] [place] [address], [place] [address]. All rights reserved. This information is not intended as a substitute for professional medical care. Always follow your healthcare professional's instructions.

## 2021-06-18 NOTE — LETTER
Letter by Clementina Bales CNP at      Author: Clementina Bales CNP Service: -- Author Type: --    Filed:  Encounter Date: 2/1/2019 Status: (Other)       February 1, 2019     Patient: Timoteo Alamo   YOB: 1995   Date of Visit: 2/1/2019       To Whom it May Concern:    Timoteo Alamo was seen in my clinic on 2/1/2019.  Please excuse her from missing work on 1/31/19 and 2/1/19.    If you have any questions or concerns, please don't hesitate to call.    Sincerely,         Electronically signed by Clementina Bales CNP

## 2021-06-18 NOTE — PATIENT INSTRUCTIONS - HE
Patient Instructions by Baylee Luevano FNP at 3/4/2021  7:00 AM     Author: Baylee Luevano FNP Service: -- Author Type: Nurse Practitioner    Filed: 3/4/2021  7:47 AM Encounter Date: 3/4/2021 Status: Signed    : Baylee Luevano FNP (Nurse Practitioner)           Preventing Skin Cancer     Use sunscreen of SPF 30 or greater. Apply liberally.   Relaxing in the sun may feel good. But it isnt good for your skin. In fact, the suns harmful rays are the major cause of skin cancer. This is a serious disease that can be life-threatening. People of all ages, races, and backgrounds are at risk.  Skin cancer is the most common cancer in the [place] But in most cases, it can be prevented.  Your role in prevention  You can act today to help prevent skin cancer. Start by avoiding the suns UV (ultraviolet) rays. And dont use tanning beds or lamps. They are no safer than the sun. Taking these steps can help keep you from getting skin cancer. It can also help prevent wrinkles and other aging effects caused by the sun. Make sure your children also follow these safeguards. Now is the time to start taking steps to prevent skin cancer.  When you are outdoors  Protect your skin when you go out during the day. Take safety steps whenever you go out to eat, run errands by car or on foot, or do any outdoor activity. There isnt just one easy way to protect your skin. Its best to follow all of these steps:    Wear tightly woven clothing that covers your skin. Put on a wide-brimmed hat to protect your face, ears, and scalp.    Watch the clock. Try to stay out of the sun between 10 a.m. and 4 p.m. That's when the sun's rays are strongest.    Head for the shade or create your own. Use an umbrella when sitting or strolling.    Know that the suns rays can reflect off sand, water, and snow. This can harm your skin. Take extra care when you are near reflective surfaces.    Keep in mind that even when the weather is hazy or cloudy,  "your skin can be exposed to strong UV rays.    Shield your skin with sunscreen. Also use sunscreen on your childrens skin. Keep babies younger than 6 months old out of the sun.  Tips for using sunscreen  To help prevent skin cancer, choose the right sunscreen and use it correctly. Try these tips:    Choose a sunscreen that has an SPF (sun protection factor) of at least 30. Also choose a sunscreen labeled \"broad spectrum. This will protect you from both UVA and UVB (ultraviolet A and B) rays.    If one brand irritates your skin, try another, such as one without fragrance.    Use a water-resistant sunscreen if you swim or sweat.    Use at least 1 ounce of sunscreen to cover exposed areas. This is enough to fill a shot glass. You might need to adjust the amount depending on your body size.    Put the sunscreen on dry skin about 15 minutes before going outdoors. This gives it time to soak in.    Reapply sunscreen every 2 hours. If youre active, do this more often.    Cover any sun-exposed skin, from your face to your feet. Dont forget your scalp, ears, and lips.    Know that while sunscreen helps protect you, it isnt enough. Sunscreens extend the length of time you can be outdoors before your skin starts to get red. But they don't give you total protection. Using sunscreen doesn't mean you can stay out in the sun for an unlimited time. Your skin cells are still being damaged. You should also wear protective clothing. And try to stay out of the sun as much as you can, especially from 10 a.m. to 4 p.m.  Date Last Reviewed: 7/1/2019 2000-2019 [employer]. [address] [place] [address], [place] [address]. All rights reserved. This information is not intended as a substitute for professional medical care. Always follow your healthcare professional's instructions.             "

## 2021-06-19 NOTE — LETTER
Letter by Baylee Luevano FNP at      Author: Baylee Luevano FNP Service: -- Author Type: --    Filed:  Encounter Date: 11/26/2019 Status: Signed         November 26, 2019     Patient: Timoteo Alamo   YOB: 1995   Date of Visit: 11/26/2019       To Whom It May Concern:    Timoteo Alamo was seen in my clinic on 11/26/2019. It is my medical opinion that Timoteo Alamo may return to work on 11/27/2019. I recommend that she should be accommodated for her time missed at work yesterday and today due to her current health issues.     If you have any questions or concerns, please don't hesitate to call.    Sincerely,        Electronically signed by ROSALINA Delgado

## 2021-06-19 NOTE — PROGRESS NOTES
Assessment:   1. IUD check up  During wet prep, I discovered that the IUD is not well placed. I did inform patient of my finding and the need to see a midwife for removal of IUD.  - Ambulatory referral to Midwifery    2. Vaginal discharge  Positive for yeast infection.  Initiate treatment with metronidazole.  Patient was called and informed.  - Wet Prep, Vaginal  - Chlamydia trachomatis & Neisseria gonorrhoeae, Amplified Detection.      Plan:   Symptomatic local care discussed.  Oral antifungal see orders.  Discussed safe sex.     Subjective:   Timoteo Alamo is a 23 y.o. female who presents for evaluation of an abnormal vaginal discharge. Symptoms have been present for 5 days. Vaginal symptoms: discharge described as white. Contraception: IUD. She denies abnormal bleeding Sexually transmitted infection risk: possible STD exposure. Menstrual flow: regular every 28-30 days.    The following portions of the patient's history were reviewed and updated as appropriate: allergies, current medications and problem list    Review of Systems  General: Denies general constitutional problems  Eyes: Denies problems  Ears/Nose/Throat: Denies problems  Cardiovascular: Denies problems  Respiratory: Denies problems  Gastrointestinal: Denies problems  Genitourinary: Denies problems  Musculoskeletal: Denies problems  Skin: Denies problems  Neurologic: Denies problems  Psychiatric: Denies problems  Endocrine: Denies problems  Heme/Lymphatic: Denies problems  Allergic/Immunologic: Denies problems      Objective:         Vitals:    07/18/18 1411   BP: 102/74   Pulse: 80   Temp: 99  F (37.2  C)   TempSrc: Oral   Weight: 168 lb 8 oz (76.4 kg)     Physical Exam:  General Appearance: Alert, cooperative, no distress, appears stated age  Head: Normocephalic, without obvious abnormality, atraumatic  Eyes: PERRL, conjunctiva/corneas clear, EOM's intact  Ears: Normal TM's and external ear canals, both ears  Nose: Nares normal, septum  midline,mucosa normal, no drainage  Throat: Lips, mucosa, and tongue normal; teeth and gums normal  Neck: Supple, symmetrical, trachea midline, no adenopathy;  thyroid: not enlarged, symmetric, no tenderness/mass/nodules; no carotid bruit or JVD  Back: Symmetric, no curvature, ROM normal, no CVA tenderness  Lungs: Clear to auscultation bilaterally, respirations unlabored  Breasts: No breast masses, tenderness, asymmetry, or nipple discharge.  Heart: Regular rate and rhythm, S1 and S2 normal, no murmur, rub, or gallop,   Abdomen: Soft, non-tender, bowel sounds active all four quadrants,  no masses, no organomegaly  Pelvic:Perineum: is normal  Vulva: normal  Vagina: normal mucosa, thin grey discharge, wet prep done  Cervix: anteverted  Uterus: bulky  Adnexa: normal adnexa  Bony Pelvis: average  Extremities: Extremities normal, atraumatic, no cyanosis or edema  Skin: Skin color, texture, turgor normal, no rashes or lesions  Lymph nodes: Cervical, supraclavicular, and axillary nodes normal  Neurologic: Normal

## 2021-06-19 NOTE — PROGRESS NOTES
Assessment:   Intrauterine device malpositioned and removed, appears intact  UPT negative  Yeast infection- will treat with monistat-7.     Plan:   -Recommended pelvic ultrasound to evaluate uterine cavity and rule out bicornuate uterus which could be the cause of the malpositioned IUD.  If the pelvic ultrasound shows normal anatomy, informed patient she would be a candidate for another IUD.  If she does desire to return for an IUD placement, recommended no unprotected intercourse for 2 weeks prior to IUD placement.  Since technically she has not been protected against pregnancy recently due to malpositioned IUD, recommended abstaining or using condoms for 2 weeks before returning for IUD placement.  -Recommended a UPT in 1-2 weeks if she does not get her regular period.   -Patient requests oral contraceptive prescription today.  Has tolerated oral contraceptives while in the past prior to Nexplanon and IUD use.  ACHES warning signs reviewed and recommended return to clinic in 1 month for blood pressure check.  Patient requests 1 month supply at a time with refills for one year rather than 3 month supply.    TT with patient 40 mns >50% time spent in counseling or coordination of care.     Helga Clemens, MADDIE, CNM    Subjective:       Timoteo Alamo is a 23 y.o. female who presents for IUD check with her fiancé.  She was seen by her primary care provider this morning for vaginal irritation.  She was diagnosed with yeast infection and the provider noted the IUD to be malpositioned so she was referred to the midwives.  She had her ParaGard IUD inserted in January 2017 through [facility].  In February she returned for an IUD check and it was noted to be correctly inserted.  She gets regular cycles each month that last approximately 3 days.  She does have heavy bleeding but denies any abdominal cramping.  She denies any change in her bleeding or change in her cramping or new onset of abdominal pain.  Her  "last menstrual period was sometime in mid June, though she is unsure of the exact date.  She has been able to check the strings occasionally and they were noted to be present and normal in length.    Prior to the ParaGard IUD, she used Nexplanon and OCPs for contraception.  She did not like the constant irregular bleeding associated with the Nexplanon and was pleased that her body tolerated the ParaGard well.    Review of Systems  Pertinent items are noted in HPI. Denies heavy bleeding, abdominal pain, fever, chills, dizziness.      Objective:   /74 (Patient Position: Sitting, Cuff Size: Adult Regular)  Pulse 80  Ht 5' 3.5\" (1.613 m)  Wt 168 lb 8 oz (76.4 kg)  Breastfeeding? No  BMI 29.38 kg/m2    Physical Exam:  General: Pleasant, articulate, well-groomed, well-nourished female.  Not in any apparent distress.  Abdomen: Soft, nontender, no masses palpated, negative CVAT.  External genitalia: Normal hair distribution, no lesions.  Urethral opening: Without lesions or discharge. No tenderness.   Bladder: Without masses, or tenderness.  Vagina: Pink, rugated, clumpy thick white homogenous discharge, some adherent to vaginal side walls.   Cervix: nulliparous, pink, smooth, no lesions. White IUD base with IUD strings 4cm in length visualized in cervical os. After verbal informed consent obtained, IUD strings were grasped with a ring forceps and IUD was removed gently and without incident.  The ParaGard IUD appears to be intact with one arm significantly bent and nearly parallel to the main body of IUD.           "

## 2021-06-19 NOTE — LETTER
Letter by Baylee Luevano FNP at      Author: Baylee Luevano FNP Service: -- Author Type: --    Filed:  Encounter Date: 11/26/2019 Status: Signed         November 26, 2019     Patient: Timoteo Alamo   YOB: 1995   Date of Visit: 11/26/2019       To Whom It May Concern:    Timoteo Alamo was seen in my clinic on 11/26/2019. It is my medical opinion that Timoteo Alamo may return to work today. I recommend that she should be accommodated for her times at work yesterday and today due to her current health issues.     If you have any questions or concerns, please don't hesitate to call.    Sincerely,        Electronically signed by ROSALINA Delgado

## 2021-06-21 NOTE — LETTER
Letter by Ciera Vickers, RN at      Author: Ciera Vickers RN Service: -- Author Type: --    Filed:  Encounter Date: 3/10/2021 Status: (Other)         Timoteo Alamo  [address] [facility] [address]             March 10, 2021         Dear Ms. Alamo,    We are happy to inform you that your recent Pap smear is normal.    It is recommended that you have your next Pap smear in 3 years. You will also need to return to the clinic every year for an annual wellness visit.    If you have additional questions regarding this result, please contact our office and we will be happy to assist you.      Sincerely,    Your [facility] Team

## 2021-06-22 NOTE — PROGRESS NOTES
Subjective:  Patient presents today for Paragard insertion.  Patient has used this method previously and had it removed when incidentally found on pelvic exam to have become dislodged, there was concern for possible uterine abnormality but this was ruled out with the use of ultrasound and patient has return to clinic to have ParaGard IUD inserted again.  Patient reports no unprotected sex in the past 2 weeks.  Patient shares that she is somewhat nervous about having this placed today as she does have to return to work this morning.    IUD Insertion Procedure Note    Pre-operative Diagnosis: desires IUD contraception, stable.   Post-operative Diagnosis: same    Indications: contraception  No contraindications to Paragard IUD:   Not pregnant  No abnormality of uterus distorting cavity per US.   No acute PID or pelvic infection  No postpartum endometritis or postabortal endometritis in past 3 months  No uterine or cervical malignancy  No abnormal uterine or vaginal bleeding of unknown etiology  No mucopurulent cervicitis  No West's disease  No allergy to any component of Paragard      Procedure Details   Urine pregnancy test was done today and result was negative.  GC/CT done today, result pending. The risks (including infection, bleeding, pain, and uterine perforation) and benefits of the procedure were explained to the patient and Written informed consent was obtained.    Bimanual Exam performed, uterus is mid-position  Cervix cleansed with Betadine. Tenaculum placed at 11 & 1 o'clock. Uterus sounded to 9 cm. IUD inserted without difficulty. String visible and trimmed to 2-3 cms. Patient tolerated procedure well.    IUD Information:  ParaGard.  LOT # [id number]  Condition:  Stable    Complications:  None    Plan:  Prior to insertion of ParaGard we reviewed the mechanism of action, length of use, risks of insertion including but not limited to infection, bleeding, pain, uterine perforation, discussed that the risk of  pregnancy is low however should pregnancy occur a higher risk that this is a tubal pregnancy, but with any positive pregnancy test removal of IUD is warranted.    The patient was advised to call for any fever or for prolonged or severe pain or bleeding.  Discussed PAINS: (Period related problems or pregnancy symptoms, abdominal pain/pain during intercourse, infections/unusual discharge, not feeling well; fever, chills, string problems)  Nothing in the vagina x 48 hours.  She was advised to use OTC analgesics as needed for mild to moderate pain.   Advised to check string monthly, and RTC in 6 weeks to check IUD.  Offered pelvic ultrasound in lieu of 6-week check, patient shares that she would likely come back to clinic for pelvic exam with LEONOR.    MADDIE Quinones, [facility] Nurse-Midwives

## 2021-06-23 NOTE — PROGRESS NOTES
Assessment:     1. Nausea and vomiting, intractability of vomiting not specified, unspecified vomiting type  HM1(CBC and Differential)    Comprehensive Metabolic Panel    Rapid Strep A Screen-Throat    ondansetron disintegrating tablet 8 mg (ZOFRAN-ODT)    HM1 (CBC with Diff)    Group A Strep, RNA Direct Detection, Throat    ondansetron (ZOFRAN ODT) 4 MG disintegrating tablet   2. Viral gastroenteritis            Plan:     Differential diagnosis include but not limited to vertigo, nausea, vomiting, a viral gastroenteritis.  On exam I did not find any indication for bacterial infection.  Rapid strep done since patient was complaining of abdominal pain, negative.  CBC done to rule out infectious process, negative.  CMP done, it would take a few hours before we get the results back.  Discussed with the patient about her preliminary results.  At this time I think she does have gastroenteritis which she will manage with supportive care and let the process run its course.  Advised patient to eat small frequent meals, eat some saltine crackers and drink plenty of fluids to avoid dehydration.  Once I get the results for CMP I will contact patient.  I discussed red flag symptoms, return precautions to clinic/ER and follow up care with patient/parent.  Expected clinical course, symptomatic cares advised. Questions answered. Patient/parent amenable with plan.  Patient was given a letter to be excused from work from is seen on January 31, 2019 and February 1, 2019.     Subjective:       24 y.o. female presents for evaluation of emesis, nausea, dizziness, and headache.  Patient reports that her emesis and nausea started about 3 days ago, the dizziness started about 2 days ago, and yesterday she had a severe headache.  Currently she still continues to feel nauseous but she has not vomited since the last time she vomited about 3 days ago.  She denies any cough, shortness of breath, sore throat, or fever.  For the headache she took  2 Tylenols extra strength x1 dose yesterday.  Today her headaches better.  She works in an office building at a desk, a few of her coworkers had stomach flu and she is not sure if that is the same thing that she has.  She has decreased appetite, yesterday she had some crackers and last night she also had a piece of fried fish but today she has not had anything to eat.  She denies eating new new foods, or any change in diet.  The only thing she noticed was she used to drink regularly at least a glass of wine and about a week ago she stopped drinking.  Patient admits that previously when she was younger she had been told that she has gastroesophageal reflux.  She does have pain in the epigastric area but she feels like she has something sitting there and the pain is not as when she had gastroesophageal reflux.    The following portions of the patient's history were reviewed and updated as appropriate: allergies, current medications, past family history, past medical history, past social history, past surgical history and problem list.    Review of Systems  A 12 point comprehensive review of systems was negative except as noted.      Objective:      /70   Pulse 84   Temp 98.4  F (36.9  C) (Oral)   Resp 17   Wt 158 lb (71.7 kg)   LMP 01/27/2019 (Exact Date)   SpO2 97%   BMI 27.55 kg/m    General appearance: alert, appears stated age and cooperative  Head: Normocephalic, without obvious abnormality, atraumatic, sinuses nontender to percussion  Eyes: conjunctivae/corneas clear. PERRL, EOM's intact. Fundi benign.  Ears: abnormal TM right ear - bulging and air-fluid level and abnormal TM left ear - bulging and air-fluid level  Nose: Nares normal. Septum midline. Mucosa normal. No drainage or sinus tenderness., moderate congestion  Throat: abnormal findings: moderate oropharyngeal erythema and tonsillar hypertrophy 2+  Lungs: clear to auscultation bilaterally  Heart: regular rate and rhythm, S1, S2 normal, no  murmur, click, rub or gallop  Abdomen: soft, non-tender; bowel sounds normal; no masses,  no organomegaly  Extremities: extremities normal, atraumatic, no cyanosis or edema  Pulses: 2+ and symmetric  Skin: Skin color, texture, turgor normal. No rashes or lesions  Lymph nodes: Cervical, supraclavicular, and axillary nodes normal.  Neurologic: Grossly normal     This note has been dictated using voice recognition software. Any grammatical or context distortions are unintentional and inherent to the software

## 2021-06-24 NOTE — PROGRESS NOTES
Assessment:   1. Adjustment disorder with mixed anxiety and depressed mood  - DULoxetine (CYMBALTA) 20 MG capsule; Take 1 capsule (20 mg total) by mouth 2 (two) times a day.  Dispense: 60 capsule; Refill: 2  - hydrOXYzine pamoate (VISTARIL) 25 MG capsule; Take 1-2 capsules (25-50 mg total) by mouth at bedtime.  Dispense: 60 capsule; Refill: 0  - Ambulatory referral to Psychology    Plan:   Medications: Cymbalta.  Recommended counseling.  Reviewed concept of anxiety as biochemical imbalance of neurotransmitters and rationale for treatment.  Instructed patient to contact office or on-call physician promptly should condition worsen or any new symptoms appear and provided on-call telephone numbers. IF THE PATIENT HAS ANY SUICIDAL OR HOMICIDAL IDEATIONS, CALL THE OFFICE, DISCUSS WITH A SUPPORT MEMBER, OR GO TO THE ER IMMEDIATELY. Patient was agreeable with this plan.  Follow up: 2 weeks.  Spent 25 minutes (>50% of visit) discussing the risks of anxiety disorder, the  pathophysiology, etiology, risks, and principles of treatment.     Subjective:   Timoteo Alamo is a 24 y.o. female who presents for follow up of anxiety disorder and panic attacks. She has the following anxiety symptoms: difficulty concentrating, feelings of losing control, irritable and panic attacks. Onset of symptoms was approximately 3 weeks ago. Symptoms have been gradually worsening since that time. Patient reports that she was diagnosed with anxiety while she was in college and she took Lexapro and it helped but she had side effects such as sexual dysfunction and she later stopped the medication. She recently lost her dad and also found out that her boyfriend is cheating on her so they bole up. She has not used her medication for over a year but recently she noticed that she is becoming more anxious and she sometimes gets very anxious and she will start to panic. This has happened while she is driving and she had to pull over. She denies current  suicidal and homicidal ideation. Family history significant for alcoholism, heart disease and substance abuse. Risk factors: none. She complains of the following medication side effects: sexual dysfunction.    The following portions of the patient's history were reviewed and updated as appropriate: allergies, current medications, past family history, past medical history, past social history, past surgical history and problem list.    Review of Systems  Pertinent items are noted in HPI.      Objective:      /64   Pulse 64   Wt 160 lb 1 oz (72.6 kg)   SpO2 99%   BMI 27.91 kg/m     General:  alert, appears stated age and cooperative   Affect/Behavior:  full facial expressions, good grooming, good insight, normal perception, normal reasoning, normal speech pattern and content and normal thought patterns

## 2021-06-24 NOTE — TELEPHONE ENCOUNTER
Medication Question or Clarification  Who is calling: Pharmacy: [facility]  What medication are you calling about?: Hydroxyzine Pamoate 25mg capsule  What dose do you take?: 1-2 capsules daily at bedtime and 1 capsule during the day as needed  How often are you taking the medication?: see above  Who prescribed the medication?: Baylee Luevano, ROSALINA    What is your question/concern?: Fax received from pharmacy stating that the above medication is on backorder. Will the provider send an alternative?  Pharmacy: [facility]- [place]  Okay to leave a detailed message?: No-speak to pharmacy staff  Site CMT - Please call the pharmacy to obtain any additional needed information.

## 2021-06-25 NOTE — TELEPHONE ENCOUNTER
RN cannot approve Refill Request    RN can NOT refill this medication med is not covered by policy/route to provider. Last office visit: 12/19/2019 Baylee Luevano FNP Last Physical: 3/4/2021 Last MTM visit: Visit date not found Last visit same specialty: 12/19/2019 Baylee Luevano FNP.  Next visit within 3 mo: Visit date not found  Next physical within 3 mo: Visit date not found      Aziza Larsen, [facility] Triage/Med Refill 6/11/2021    Requested Prescriptions   Pending Prescriptions Disp Refills     fluconazole (DIFLUCAN) 150 MG tablet [Pharmacy Med Name: Fluconazole 150 MG Oral Tablet] 2 tablet 0     Sig: TAKE ONE TABLET BY MOUTH AS A ONE-TIME DOSE       There is no refill protocol information for this order

## 2021-06-30 NOTE — PROGRESS NOTES
Progress Notes by Baylee Luevano FNP at 3/4/2021  7:00 AM     Author: Baylee Luevano FNP Service: -- Author Type: Nurse Practitioner    Filed: 3/4/2021  9:13 AM Encounter Date: 3/4/2021 Status: Signed    : Baylee Luevano FNP (Nurse Practitioner)       FEMALE PREVENTATIVE EXAM    Assessment and Plan:   Patient has been advised of split billing requirements and indicates understanding: Yes    1. Encounter for general adult medical examination without abnormal findings  Healthy female exam  - Basic Metabolic Panel  - Hemoglobin  - Vitamin D, Total (25-Hydroxy)    2. Encounter for screening for lipoid disorders  - Lipid Cascade- FASTING    3. Special screening examination for human papillomavirus (HPV)  - Gynecologic Cytology (PAP Smear)    4. Adjustment disorder with mixed anxiety and depressed mood  Stable depression with elevated anxiety. Patient will like to wait and see   - DULoxetine (CYMBALTA) 30 MG capsule; Take 3 capsules (90 mg total) by mouth daily.  Dispense: 270 capsule; Refill: 3      Next follow up:  No follow-ups on file.    Immunization Review  Adult Imm Review: No immunizations due today  Appropriate consumption of alcohol advice given as per AVS.      I discussed the following with the patient:   Adult Healthy Living: Importance of regular exercise  Healthy nutrition  Getting adequate sleep  Stress management  Use of seat belts  Distracted driving  Helmets  Sporting equipment safety  Firearm safety  Contraception options  Supplement use  Herbal medications/alternative medical therapies    I have had an Advance Directives discussion with the patient.    Subjective:   Chief Complaint: Timoteo Alamo is an 26 y.o. female here for a preventative health visit.    Patient has been advised of split billing requirements and indicates understanding: Yes  HPI:  Patient reports that she is doing well and she working and in her masters program. He is hoping to finish next year. She is excited  "about the practicum. She reports that her mother is back in her life and she is also in a relationship. She reports that she gets anxious because of the stress in her job and her education. She reports that her mood is better and she is handling things better. She wanted to know if she could reduce her duloxetine. She denied any suicidal and homicidal ideations.     Healthy Habits  Are you taking a daily aspirin? No  Do you typically exercising at least 40 min, 3-4 times per week?  Yes  Do you usually eat at least 4 servings of fruit and vegetables a day, include whole grains and fiber and avoid regularly eating high fat foods? Yes  Have you had an eye exam in the past two years? Yes  Do you see a dentist twice per year? NO  Do you have any concerns regarding sleep? YES night sweats    Safety Screen  If you own firearms, are they secured in a locked gun cabinet or with trigger locks? The patient does not own any firearms  No data recorded    Review of Systems:  Please see above.  The rest of the review of systems are negative for all systems.     Pap History:   No - age 21-29 PAP every 3 years recommended  Cancer Screening       Status Date      PAP SMEAR Overdue 7/19/2020      Done 7/19/2017      Patient has more history with this topic...          Patient Care Team:  Baylee Luevano FNP as PCP - General (Nurse Practitioner)  Baylee Luevano FNP as Assigned PCP        History     Not marked as reviewed during this visit.            Objective:   Vital Signs: There were no vitals taken for this visit.       PHYSICAL EXAM  /73   Pulse 87   Resp (!) 97   Ht 5' 5\" (1.651 m)   Wt 147 lb 4 oz (66.8 kg)   BMI 24.50 kg/m    General appearance: alert, appears stated age and cooperative  Head: Normocephalic, without obvious abnormality, atraumatic  Eyes: conjunctivae/corneas clear. PERRL, EOM's intact. Fundi benign.  Ears: normal TM's and external ear canals both ears  Throat: lips, mucosa, and tongue " normal; teeth and gums normal  Neck: no adenopathy, no carotid bruit, no JVD, supple, symmetrical, trachea midline and thyroid not enlarged, symmetric, no tenderness/mass/nodules  Back: symmetric, no curvature. ROM normal. No CVA tenderness.  Lungs: clear to auscultation bilaterally  Heart: regular rate and rhythm, S1, S2 normal, no murmur, click, rub or gallop  Abdomen: soft, non-tender; bowel sounds normal; no masses,  no organomegaly  Pelvic: cervix normal in appearance, external genitalia normal, no adnexal masses or tenderness, no cervical motion tenderness, rectovaginal septum normal, uterus normal size, shape, and consistency and vagina normal without discharge  Extremities: extremities normal, atraumatic, no cyanosis or edema  Pulses: 2+ and symmetric  Skin: Skin color, texture, turgor normal. No rashes or lesions  Lymph nodes: Cervical, supraclavicular, and axillary nodes normal.  Neurologic: Grossly normal         Medication List          Accurate as of March 4, 2021  9:12 AM. If you have any questions, ask your nurse or doctor.            CHANGE how you take these medications    DULoxetine 30 MG capsule  Also known as: CYMBALTA  INSTRUCTIONS: Take 3 capsules (90 mg total) by mouth daily.  What changed:     how much to take    when to take this    Another medication with the same name was removed. Continue taking this medication, and follow the directions you see here.  Changed by: ROSALINA Delgado           CONTINUE taking these medications    acetaminophen 500 MG tablet  Also known as: TYLENOL  INSTRUCTIONS: Take 500 mg by mouth every 6 (six) hours as needed for pain.        busPIRone 10 MG tablet  Also known as: BUSPAR  INSTRUCTIONS: TAKE 1 TABLET BY MOUTH THREE TIMES DAILY        copper 380 square mm Iud IUD  Also known as: PARAGARD  INSTRUCTIONS: 1 each by Intrauterine route once.        hydrOXYzine pamoate 25 MG capsule  Also known as: VISTARIL  INSTRUCTIONS: Take 1-2 capsules (25-50 mg total)  by mouth at bedtime.  Doctor's comments: And one tablet during the day as needed for anxiety              Where to Get Your Medications      These medications were sent to [facility] [address] [place] [id number]    Phone: [phone]     DULoxetine 30 MG capsule         Additional Screenings Completed Today:

## 2021-07-03 NOTE — ADDENDUM NOTE
Addendum Note by Baylee Brown FNP at 7/23/2018 11:34 AM     Author: Baylee Brown FNP Service: -- Author Type: Nurse Practitioner    Filed: 7/23/2018 11:34 AM Encounter Date: 7/18/2018 Status: Signed    : Baylee Brown FNP (Nurse Practitioner)    Addended by: BAYLEE BROWN on: 7/23/2018 11:34 AM        Modules accepted: Orders

## 2021-07-04 NOTE — ADDENDUM NOTE
Addendum Note by Baylee Brown FNP at 3/4/2021  7:00 AM     Author: Baylee Brown FNP Service: -- Author Type: Nurse Practitioner    Filed: 3/11/2021  8:43 AM Encounter Date: 3/4/2021 Status: Signed    : Baylee Brown FNP (Nurse Practitioner)    Addended by: BAYLEE BROWN on: 3/11/2021 08:43 AM        Modules accepted: Orders

## 2021-07-14 PROBLEM — F32.1 MODERATE MAJOR DEPRESSION (H): Status: RESOLVED | Noted: 2021-03-04 | Resolved: 2021-03-04

## 2021-07-14 PROBLEM — T83.32XA MALPOSITIONED IUD: Status: RESOLVED | Noted: 2018-07-18 | Resolved: 2019-05-09

## 2021-10-04 ENCOUNTER — HEALTH MAINTENANCE LETTER (OUTPATIENT)
Age: 26
End: 2021-10-04

## 2021-10-14 ENCOUNTER — OFFICE VISIT (OUTPATIENT)
Dept: FAMILY MEDICINE | Facility: CLINIC | Age: 26
End: 2021-10-14
Payer: COMMERCIAL

## 2021-10-14 VITALS
DIASTOLIC BLOOD PRESSURE: 64 MMHG | SYSTOLIC BLOOD PRESSURE: 102 MMHG | BODY MASS INDEX: 24.11 KG/M2 | WEIGHT: 144.9 LBS | HEART RATE: 90 BPM

## 2021-10-14 DIAGNOSIS — F41.1 GAD (GENERALIZED ANXIETY DISORDER): Primary | ICD-10-CM

## 2021-10-14 PROCEDURE — 99214 OFFICE O/P EST MOD 30 MIN: CPT | Performed by: NURSE PRACTITIONER

## 2021-10-14 RX ORDER — COPPER 313.4 MG/1
1 INTRAUTERINE DEVICE INTRAUTERINE
COMMUNITY

## 2021-10-14 RX ORDER — BUSPIRONE HYDROCHLORIDE 10 MG/1
30 TABLET ORAL 2 TIMES DAILY
Qty: 60 TABLET | Refills: 11 | Status: SHIPPED | OUTPATIENT
Start: 2021-10-14 | End: 2021-12-15

## 2021-10-14 RX ORDER — BUSPIRONE HYDROCHLORIDE 10 MG/1
TABLET ORAL
COMMUNITY
Start: 2021-10-13 | End: 2021-10-14

## 2021-10-14 RX ORDER — DULOXETIN HYDROCHLORIDE 30 MG/1
CAPSULE, DELAYED RELEASE ORAL
COMMUNITY
Start: 2021-10-13 | End: 2021-10-14

## 2021-10-14 RX ORDER — DULOXETIN HYDROCHLORIDE 30 MG/1
90 CAPSULE, DELAYED RELEASE ORAL DAILY
Qty: 270 CAPSULE | Refills: 3 | Status: SHIPPED | OUTPATIENT
Start: 2021-10-14 | End: 2022-06-03

## 2021-10-14 RX ORDER — BUPROPION HYDROCHLORIDE 150 MG/1
150 TABLET ORAL EVERY MORNING
Qty: 30 TABLET | Refills: 1 | Status: SHIPPED | OUTPATIENT
Start: 2021-10-14 | End: 2021-12-29

## 2021-10-14 ASSESSMENT — ANXIETY QUESTIONNAIRES
2. NOT BEING ABLE TO STOP OR CONTROL WORRYING: NEARLY EVERY DAY
3. WORRYING TOO MUCH ABOUT DIFFERENT THINGS: NEARLY EVERY DAY
GAD7 TOTAL SCORE: 15
7. FEELING AFRAID AS IF SOMETHING AWFUL MIGHT HAPPEN: NOT AT ALL
GAD7 TOTAL SCORE: 15
7. FEELING AFRAID AS IF SOMETHING AWFUL MIGHT HAPPEN: NOT AT ALL
1. FEELING NERVOUS, ANXIOUS, OR ON EDGE: NEARLY EVERY DAY
6. BECOMING EASILY ANNOYED OR IRRITABLE: NOT AT ALL
5. BEING SO RESTLESS THAT IT IS HARD TO SIT STILL: NEARLY EVERY DAY
8. IF YOU CHECKED OFF ANY PROBLEMS, HOW DIFFICULT HAVE THESE MADE IT FOR YOU TO DO YOUR WORK, TAKE CARE OF THINGS AT HOME, OR GET ALONG WITH OTHER PEOPLE?: VERY DIFFICULT
4. TROUBLE RELAXING: NEARLY EVERY DAY
GAD7 TOTAL SCORE: 15

## 2021-10-14 ASSESSMENT — PATIENT HEALTH QUESTIONNAIRE - PHQ9
10. IF YOU CHECKED OFF ANY PROBLEMS, HOW DIFFICULT HAVE THESE PROBLEMS MADE IT FOR YOU TO DO YOUR WORK, TAKE CARE OF THINGS AT HOME, OR GET ALONG WITH OTHER PEOPLE: SOMEWHAT DIFFICULT
SUM OF ALL RESPONSES TO PHQ QUESTIONS 1-9: 12
SUM OF ALL RESPONSES TO PHQ QUESTIONS 1-9: 12

## 2021-10-14 NOTE — PROGRESS NOTES
Answers for HPI/ROS submitted by the patient on 10/14/2021  If you checked off any problems, how difficult have these problems made it for you to do your work, take care of things at home, or get along with other people?: Somewhat difficult  PHQ9 TOTAL SCORE: 12  SIDNEY 7 TOTAL SCORE: 15    SUBJECTIVE:  Timoteo Alamo is an 26 year old female who presents for follow-up   of depressive symptoms.  Initially evaluated three years ago.  Notes from that visit reviewed.  Current symptoms include depressed mood, difficulty with concentration, anxiety. Symptoms that have subjectively improved include depressed mood. Previous and current treatment modalities employed include individual therapy and medication(s) duloxetine and buspirone.     Organic causes of depression present: none    Current Outpatient Medications   Medication     buPROPion (WELLBUTRIN XL) 150 MG 24 hr tablet     busPIRone (BUSPAR) 10 MG tablet     DULoxetine (CYMBALTA) 30 MG capsule     paragard intrauterine copper device     No current facility-administered medications for this visit.     No Known Allergies  Side effects of medication: none    Social History     Tobacco Use     Smoking status: Never Smoker     Smokeless tobacco: Never Used     Tobacco comment: No exposure to secondhand smoke.   Substance Use Topics     Alcohol use: Yes     Alcohol/week: 7.0 standard drinks     Comment: social drinking         Neurologic: negative  Psychiatric: negative  Endocrine: negative    OBJECTIVE:  /64 (BP Location: Right arm, Cuff Size: Adult Regular)   Pulse 90   Wt 65.7 kg (144 lb 14.4 oz)   BMI 24.11 kg/m    Mental Status Examination  Posture and motor behavior: negative  Dress, grooming, personal hygiene: negative  Facial expression: negative  Speech: negative  Mood: negative  Coherency and relevance of thought: negative  Thought content: negative  Perceptions: negative  Orientation: negative  Attention and concentration: negative  Memory: :  negative  Information: negative  Vocabulary: negative  Abstract reasoning: negative  Judgment: negative    General appearance: healthy, alert and no distress  Eyes: conjunctivae/corneas clear. PERRL, EOM's intact. Fundi benign  Ears: negative  Oropharynx: Lips, mucosa, and tongue normal. Teeth and gums normal.  Neuro: Gait normal. Reflexes normal and symmetric. Sensation grossly WNL.    ASSESSMENT/Plan:  1. SIDNEY (generalized anxiety disorder)  Worsening anxiety.  A shared decision-making was used during this care plan.  Discussed use of buspirone and need to take buspirone twice daily instead of once daily.  Recommend adding Wellbutrin to current medication.  Patient will follow up in 2 weeks.  - busPIRone (BUSPAR) 10 MG tablet; Take 3 tablets (30 mg) by mouth 2 times daily  Dispense: 60 tablet; Refill: 11  - DULoxetine (CYMBALTA) 30 MG capsule; Take 3 capsules (90 mg) by mouth daily  Dispense: 270 capsule; Refill: 3  - buPROPion (WELLBUTRIN XL) 150 MG 24 hr tablet; Take 1 tablet (150 mg) by mouth every morning  Dispense: 30 tablet; Refill: 1

## 2021-10-15 ASSESSMENT — PATIENT HEALTH QUESTIONNAIRE - PHQ9: SUM OF ALL RESPONSES TO PHQ QUESTIONS 1-9: 12

## 2021-10-15 ASSESSMENT — ANXIETY QUESTIONNAIRES: GAD7 TOTAL SCORE: 15

## 2021-10-19 PROBLEM — F32.9 MAJOR DEPRESSION: Status: RESOLVED | Noted: 2021-03-04 | Resolved: 2021-03-04

## 2021-12-15 DIAGNOSIS — F41.1 GAD (GENERALIZED ANXIETY DISORDER): ICD-10-CM

## 2021-12-15 RX ORDER — BUSPIRONE HYDROCHLORIDE 10 MG/1
30 TABLET ORAL 2 TIMES DAILY
Qty: 60 TABLET | Refills: 0 | Status: SHIPPED | OUTPATIENT
Start: 2021-12-15

## 2021-12-28 DIAGNOSIS — F41.1 GAD (GENERALIZED ANXIETY DISORDER): ICD-10-CM

## 2021-12-29 RX ORDER — BUPROPION HYDROCHLORIDE 150 MG/1
150 TABLET ORAL EVERY MORNING
Qty: 30 TABLET | Refills: 1 | Status: SHIPPED | OUTPATIENT
Start: 2021-12-29 | End: 2022-04-21

## 2021-12-29 RX ORDER — BUPROPION HYDROCHLORIDE 150 MG/1
150 TABLET ORAL EVERY MORNING
Qty: 30 TABLET | Refills: 1 | OUTPATIENT
Start: 2021-12-29

## 2021-12-29 NOTE — TELEPHONE ENCOUNTER
Refill Request  Medication name: Pending Prescriptions:                       Disp   Refills    buPROPion (WELLBUTRIN XL) 150 MG 24 hr ta*30 tab*1            Sig: Take 1 tablet (150 mg) by mouth every morning    Who prescribed the medication: STEPHANIE  Last refill on medication: 10/14/2021  Requested Pharmacy: [facility]  Last appointment with PCP: 10/14/2021  Next appointment: Not due

## 2022-04-21 DIAGNOSIS — F41.1 GAD (GENERALIZED ANXIETY DISORDER): ICD-10-CM

## 2022-04-22 RX ORDER — BUPROPION HYDROCHLORIDE 150 MG/1
150 TABLET ORAL EVERY MORNING
Qty: 30 TABLET | Refills: 0 | Status: SHIPPED | OUTPATIENT
Start: 2022-04-22 | End: 2022-06-03

## 2022-05-15 ENCOUNTER — HEALTH MAINTENANCE LETTER (OUTPATIENT)
Age: 27
End: 2022-05-15

## 2022-06-03 DIAGNOSIS — F41.1 GAD (GENERALIZED ANXIETY DISORDER): ICD-10-CM

## 2022-06-03 RX ORDER — BUPROPION HYDROCHLORIDE 150 MG/1
150 TABLET ORAL EVERY MORNING
Qty: 30 TABLET | Refills: 0 | Status: SHIPPED | OUTPATIENT
Start: 2022-06-03

## 2022-06-03 RX ORDER — DULOXETIN HYDROCHLORIDE 30 MG/1
90 CAPSULE, DELAYED RELEASE ORAL DAILY
Qty: 90 CAPSULE | Refills: 0 | Status: SHIPPED | OUTPATIENT
Start: 2022-06-03 | End: 2022-06-03

## 2022-06-03 RX ORDER — BUPROPION HYDROCHLORIDE 150 MG/1
150 TABLET ORAL EVERY MORNING
Qty: 30 TABLET | Refills: 0 | Status: SHIPPED | OUTPATIENT
Start: 2022-06-03 | End: 2022-06-03

## 2022-06-03 RX ORDER — DULOXETIN HYDROCHLORIDE 30 MG/1
90 CAPSULE, DELAYED RELEASE ORAL DAILY
Qty: 90 CAPSULE | Refills: 0 | Status: SHIPPED | OUTPATIENT
Start: 2022-06-03

## 2022-06-03 NOTE — TELEPHONE ENCOUNTER
Left message to call back.  Please let patient know refills were sent to pharmacy. Please help patient schedule appointment to establish care with a provider. This is required in order to get any future medication refills.

## 2022-06-03 NOTE — TELEPHONE ENCOUNTER
Pt returning voice message.     Pt requesting refill for Wellbutrin XL and Duloxetine to be sent to [facility] on [address] as she is out and [facility] will not deliver until 6/12/22.

## 2022-09-11 ENCOUNTER — HEALTH MAINTENANCE LETTER (OUTPATIENT)
Age: 27
End: 2022-09-11

## 2023-06-03 ENCOUNTER — HEALTH MAINTENANCE LETTER (OUTPATIENT)
Age: 28
End: 2023-06-03

## 2024-07-07 ENCOUNTER — HEALTH MAINTENANCE LETTER (OUTPATIENT)
Age: 29
End: 2024-07-07

## 2025-07-19 ENCOUNTER — HEALTH MAINTENANCE LETTER (OUTPATIENT)
Age: 30
End: 2025-07-19